# Patient Record
Sex: MALE | Race: WHITE | NOT HISPANIC OR LATINO | Employment: UNEMPLOYED | URBAN - METROPOLITAN AREA
[De-identification: names, ages, dates, MRNs, and addresses within clinical notes are randomized per-mention and may not be internally consistent; named-entity substitution may affect disease eponyms.]

---

## 2020-08-10 ENCOUNTER — OFFICE VISIT (OUTPATIENT)
Dept: FAMILY MEDICINE CLINIC | Facility: CLINIC | Age: 35
End: 2020-08-10
Payer: COMMERCIAL

## 2020-08-10 VITALS
OXYGEN SATURATION: 97 % | HEIGHT: 71 IN | TEMPERATURE: 98.2 F | HEART RATE: 71 BPM | BODY MASS INDEX: 28.7 KG/M2 | DIASTOLIC BLOOD PRESSURE: 88 MMHG | RESPIRATION RATE: 16 BRPM | WEIGHT: 205 LBS | SYSTOLIC BLOOD PRESSURE: 146 MMHG

## 2020-08-10 DIAGNOSIS — Z00.00 ANNUAL PHYSICAL EXAM: Primary | ICD-10-CM

## 2020-08-10 DIAGNOSIS — Z02.4 DRIVER'S PERMIT PE (PHYSICAL EXAMINATION): ICD-10-CM

## 2020-08-10 PROCEDURE — 90471 IMMUNIZATION ADMIN: CPT | Performed by: FAMILY MEDICINE

## 2020-08-10 PROCEDURE — 3008F BODY MASS INDEX DOCD: CPT | Performed by: FAMILY MEDICINE

## 2020-08-10 PROCEDURE — 4004F PT TOBACCO SCREEN RCVD TLK: CPT | Performed by: FAMILY MEDICINE

## 2020-08-10 PROCEDURE — 90732 PPSV23 VACC 2 YRS+ SUBQ/IM: CPT | Performed by: FAMILY MEDICINE

## 2020-08-10 PROCEDURE — 3725F SCREEN DEPRESSION PERFORMED: CPT | Performed by: FAMILY MEDICINE

## 2020-08-10 PROCEDURE — 81002 URINALYSIS NONAUTO W/O SCOPE: CPT | Performed by: FAMILY MEDICINE

## 2020-08-10 PROCEDURE — 99385 PREV VISIT NEW AGE 18-39: CPT | Performed by: FAMILY MEDICINE

## 2020-08-10 RX ORDER — METHADONE HYDROCHLORIDE 10 MG/ML
90 CONCENTRATE ORAL DAILY
COMMUNITY
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
## 2020-08-12 LAB
SL AMB  POCT GLUCOSE, UA: NEGATIVE
SL AMB POCT URINE PROTEIN: NORMAL

## 2021-11-27 ENCOUNTER — HOSPITAL ENCOUNTER (EMERGENCY)
Facility: HOSPITAL | Age: 36
Discharge: HOME | End: 2021-11-27
Attending: EMERGENCY MEDICINE
Payer: COMMERCIAL

## 2021-11-27 ENCOUNTER — APPOINTMENT (EMERGENCY)
Dept: RADIOLOGY | Facility: HOSPITAL | Age: 36
End: 2021-11-27
Payer: COMMERCIAL

## 2021-11-27 VITALS
BODY MASS INDEX: 27.11 KG/M2 | RESPIRATION RATE: 18 BRPM | WEIGHT: 200.18 LBS | DIASTOLIC BLOOD PRESSURE: 72 MMHG | OXYGEN SATURATION: 98 % | HEIGHT: 72 IN | HEART RATE: 63 BPM | SYSTOLIC BLOOD PRESSURE: 135 MMHG | TEMPERATURE: 98.3 F

## 2021-11-27 DIAGNOSIS — S50.12XA CONTUSION OF LEFT FOREARM, INITIAL ENCOUNTER: ICD-10-CM

## 2021-11-27 DIAGNOSIS — S60.212A CONTUSION OF LEFT WRIST, INITIAL ENCOUNTER: Primary | ICD-10-CM

## 2021-11-27 PROCEDURE — 99284 EMERGENCY DEPT VISIT MOD MDM: CPT | Performed by: EMERGENCY MEDICINE

## 2021-11-27 PROCEDURE — 73110 X-RAY EXAM OF WRIST: CPT

## 2021-11-27 PROCEDURE — 99283 EMERGENCY DEPT VISIT LOW MDM: CPT

## 2021-11-27 PROCEDURE — 73090 X-RAY EXAM OF FOREARM: CPT

## 2021-11-27 RX ORDER — IBUPROFEN 600 MG/1
600 TABLET ORAL ONCE
Status: COMPLETED | OUTPATIENT
Start: 2021-11-27 | End: 2021-11-27

## 2021-11-27 RX ORDER — ACETAMINOPHEN 325 MG/1
650 TABLET ORAL ONCE
Status: COMPLETED | OUTPATIENT
Start: 2021-11-27 | End: 2021-11-27

## 2021-11-27 RX ADMIN — IBUPROFEN 600 MG: 600 TABLET ORAL at 21:26

## 2021-11-27 RX ADMIN — ACETAMINOPHEN 650 MG: 325 TABLET, FILM COATED ORAL at 21:25

## 2022-04-07 ENCOUNTER — TELEPHONE (OUTPATIENT)
Dept: FAMILY MEDICINE CLINIC | Facility: CLINIC | Age: 37
End: 2022-04-07

## 2022-06-19 NOTE — TELEPHONE ENCOUNTER
06/18/22 10:45 PM     Please provide reason for pcp removal - patient has moved and no longer following with pcp/ office OR patient is now following with an external (non [facility]) PCP? PCP should not be removed at office level for this scenario; pcp added back to chart       Thank you  Sang Gunter

## 2022-06-21 NOTE — TELEPHONE ENCOUNTER
06/21/22 9:57 AM     Thank you for your request  Your request has been received, reviewed, and the patient chart updated  The PCP has successfully been removed with a patient attribution note  Please remember to notate 'patient attribution' as reason for call if request meets guideline scenarios  This message will now be completed      Thank you  Luis Fernando Haines

## 2023-03-29 ENCOUNTER — HOSPITAL ENCOUNTER (EMERGENCY)
Facility: HOSPITAL | Age: 38
Discharge: HOME | End: 2023-03-29
Attending: EMERGENCY MEDICINE

## 2023-03-29 VITALS
TEMPERATURE: 98.4 F | OXYGEN SATURATION: 97 % | SYSTOLIC BLOOD PRESSURE: 123 MMHG | RESPIRATION RATE: 18 BRPM | DIASTOLIC BLOOD PRESSURE: 76 MMHG | HEART RATE: 77 BPM

## 2023-03-29 DIAGNOSIS — K08.89 DENTALGIA: Primary | ICD-10-CM

## 2023-03-29 DIAGNOSIS — K02.9 DENTAL DECAY: ICD-10-CM

## 2023-03-29 RX ORDER — IBUPROFEN 800 MG/1
800 TABLET ORAL EVERY 6 HOURS PRN
Qty: 30 TABLET | Refills: 0 | Status: SHIPPED | OUTPATIENT
Start: 2023-03-29

## 2023-03-29 RX ORDER — PENICILLIN V POTASSIUM 250 MG/1
500 TABLET ORAL ONCE
Status: COMPLETED | OUTPATIENT
Start: 2023-03-29 | End: 2023-03-29

## 2023-03-29 RX ORDER — PENICILLIN V POTASSIUM 500 MG/1
500 TABLET ORAL 4 TIMES DAILY
Qty: 40 TABLET | Refills: 0 | Status: SHIPPED | OUTPATIENT
Start: 2023-03-29 | End: 2023-04-08

## 2023-03-29 RX ADMIN — PENICILLIN V POTASSIUM 500 MG: 250 TABLET, FILM COATED ORAL at 21:12
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
## 2023-03-30 NOTE — ED NOTES
D/c reviewed with pt  Pt verbalized understanding and has no further questions at this time  Pt ambulatory off unit with steady gait       Rachele Loyd RN  03/29/23 8917

## 2023-04-07 PROBLEM — Z00.8 EXAM FOR POPULATION SURVEY: Status: ACTIVE | Noted: 2023-04-07

## 2023-04-07 PROBLEM — Z11.59 NEED FOR HEPATITIS C SCREENING TEST: Status: ACTIVE | Noted: 2023-04-07

## 2023-04-07 PROBLEM — Z13.220 SCREENING, LIPID: Status: ACTIVE | Noted: 2020-08-10

## 2023-04-07 PROBLEM — Z11.4 SCREENING FOR HIV (HUMAN IMMUNODEFICIENCY VIRUS): Status: ACTIVE | Noted: 2020-08-10

## 2023-04-07 PROBLEM — Z13.89 SCREENING FOR BLOOD OR PROTEIN IN URINE: Status: ACTIVE | Noted: 2020-08-10

## 2023-04-11 PROBLEM — R11.0 NAUSEA: Status: ACTIVE | Noted: 2023-04-11

## 2023-04-11 PROBLEM — F32.A DEPRESSION: Status: ACTIVE | Noted: 2023-04-11

## 2023-05-08 DIAGNOSIS — F32.89 OTHER DEPRESSION: ICD-10-CM

## 2023-05-08 RX ORDER — SERTRALINE HYDROCHLORIDE 25 MG/1
TABLET, FILM COATED ORAL
Qty: 30 TABLET | Refills: 0 | Status: SHIPPED | OUTPATIENT
Start: 2023-05-08

## 2023-05-11 ENCOUNTER — TELEPHONE (OUTPATIENT)
Dept: FAMILY MEDICINE CLINIC | Facility: CLINIC | Age: 38
End: 2023-05-11

## 2023-05-11 NOTE — TELEPHONE ENCOUNTER
Hi, my name is Jack Moreno can reach me at [phone]  I'm calling about my Sertraline 25 Milligram tablets  I need a refill  I have a appointment scheduled for May 22nd    I would need something until then and then they said that they would prescribe it to me through them  So I was told to call back since I am now out of my medication and that would be [facility] [address]  in [place]  So give me a call back  Thank you

## 2023-05-22 ENCOUNTER — OFFICE VISIT (OUTPATIENT)
Dept: PSYCHIATRY | Facility: CLINIC | Age: 38
End: 2023-05-22

## 2023-05-22 DIAGNOSIS — F32.89 OTHER DEPRESSION: ICD-10-CM

## 2023-05-22 DIAGNOSIS — F43.10 PANIC ATTACK DUE TO POST TRAUMATIC STRESS DISORDER (PTSD): ICD-10-CM

## 2023-05-22 DIAGNOSIS — F41.0 PANIC ATTACK DUE TO POST TRAUMATIC STRESS DISORDER (PTSD): ICD-10-CM

## 2023-05-22 DIAGNOSIS — F43.10 PTSD (POST-TRAUMATIC STRESS DISORDER): Primary | ICD-10-CM

## 2023-05-22 RX ORDER — SERTRALINE HYDROCHLORIDE 25 MG/1
25 TABLET, FILM COATED ORAL DAILY
Qty: 30 TABLET | Refills: 1 | Status: SHIPPED | OUTPATIENT
Start: 2023-05-22
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
## 2023-05-23 ENCOUNTER — TELEPHONE (OUTPATIENT)
Dept: PSYCHIATRY | Facility: CLINIC | Age: 38
End: 2023-05-23

## 2023-05-23 NOTE — TELEPHONE ENCOUNTER
Writer contacted patient regarding today's appointment to switch to a virtual  Patient requested a reschedule as it is a np appointment   Patient has been rescheduled for 5/30at 3 pm

## 2023-05-30 ENCOUNTER — TELEPHONE (OUTPATIENT)
Dept: PSYCHIATRY | Facility: CLINIC | Age: 38
End: 2023-05-30

## 2023-05-31 ENCOUNTER — TELEPHONE (OUTPATIENT)
Dept: PSYCHIATRY | Facility: CLINIC | Age: 38
End: 2023-05-31

## 2023-06-06 PROBLEM — Z00.8 EXAM FOR POPULATION SURVEY: Status: RESOLVED | Noted: 2023-04-07 | Resolved: 2023-06-06

## 2023-06-06 PROBLEM — Z11.59 NEED FOR HEPATITIS C SCREENING TEST: Status: RESOLVED | Noted: 2023-04-07 | Resolved: 2023-06-06

## 2023-06-15 ENCOUNTER — TELEPHONE (OUTPATIENT)
Dept: PSYCHIATRY | Facility: CLINIC | Age: 38
End: 2023-06-15

## 2023-06-15 NOTE — TELEPHONE ENCOUNTER
Writer received a message in office that when patient was called to confirm their med mgmt. Appointment they inquired about rescheduling their np therapy appointment. Please reach out to patient at earliest convenience.

## 2023-06-19 ENCOUNTER — OFFICE VISIT (OUTPATIENT)
Dept: PSYCHIATRY | Facility: CLINIC | Age: 38
End: 2023-06-19
Payer: COMMERCIAL

## 2023-06-19 DIAGNOSIS — F32.89 OTHER DEPRESSION: ICD-10-CM

## 2023-06-19 PROCEDURE — 99213 OFFICE O/P EST LOW 20 MIN: CPT | Performed by: PSYCHIATRY & NEUROLOGY

## 2023-06-19 NOTE — PSYCH
Giancarlo Holguins 1985 [id number]     The patient was seen for continuing care and pharmacotherapy  Good and bad days  Gave his boss 2 weeks notice because of persistent anxiety and panic symptoms  He is also worried about his finances and future  First half of the day he is better but as the day progresses, he starts getting more anxious and irritable  Work remains the main source of her stress  He also has some somatic complaints in the form of aches and pains  Relationship with girlfriend is up and down  Sleep pattern remains interrupted  ROS:  Anxiety-muscle aches and pains  Current Mental Status Evaluation:  Appearance:  Adequate hygiene and grooming and Good eye contact   Behavior:  cooperative and restless and fidgety   Mood:  Anxious and Dysphoric   Affect: appropriate   Speech: Normal volume and Normal rate   Thought Process:  Goal directed and coherent   Thought Content:  Does not verbalize delusional material   Perceptual Disturbances: Denies hallucinations and does not appear to be responding to internal stimuli   Risk Potential: No suicidal or homicidal ideation   Orientation:        No diagnosis found  Current Outpatient Medications   Medication Instructions   • cholecalciferol (VITAMIN D3) 400 Units, Oral, Daily   • ibuprofen (MOTRIN) 800 mg, Oral, Every 6 hours PRN   • methadone (DOLOPHINE) 90 mg, Oral, Daily   • multivitamin (THERAGRAN) TABS 1 tablet, Oral, Daily   • ondansetron (ZOFRAN) 4 mg, Oral, Every 8 hours PRN   • sertraline (ZOLOFT) 25 mg, Oral, Daily          Treatment Recommendations- Risks Benefits    After several weeks of taking Zoloft 25 mg daily, he has not shown any signs of improvement  He was instructed to increase Zoloft to 50 mg daily  He has a number of 25 mg tablets and he was instructed to finish them by taking double doses and give me a call in 1 week to review his progress and assess the need to increase Zoloft further    Risks, Benefits And Possible Side Effects Of Medications:  Risks, benefits, and possible side effects of medications explained to patient and patient verbalizes understanding    VIRTUAL VISIT [facility] verbally agrees to participate in [employer]  Pt is aware that [employer] could be limited without vital signs or the ability to perform a full hands-on physical exam  Greer Faye understands he or the provider may request at any time to terminate the video visit and request the patient to seek care or treatment in person       Godwin Amaro MD 06/19/23

## 2023-06-20 ENCOUNTER — TELEPHONE (OUTPATIENT)
Dept: PSYCHIATRY | Facility: CLINIC | Age: 38
End: 2023-06-20

## 2023-06-20 NOTE — TELEPHONE ENCOUNTER
Patient is calling regarding cancelling an appointment      Date/Time: 6/20/2023 9am    Reason:     Patient was rescheduled: YES [] NO [x]  If yes, when was Patient reschedule for:     Patient requesting call back to reschedule: YES [x] NO []

## 2023-06-21 ENCOUNTER — TELEPHONE (OUTPATIENT)
Dept: PSYCHIATRY | Facility: CLINIC | Age: 38
End: 2023-06-21

## 2023-06-21 NOTE — TELEPHONE ENCOUNTER
Writer contacted patient to schedule 4 week follow up with provider    Patient is now scheduled for 7/17 @ 10:00am

## 2023-06-27 ENCOUNTER — OFFICE VISIT (OUTPATIENT)
Dept: GASTROENTEROLOGY | Facility: AMBULARY SURGERY CENTER | Age: 38
End: 2023-06-27
Payer: COMMERCIAL

## 2023-06-27 VITALS
BODY MASS INDEX: 22 KG/M2 | DIASTOLIC BLOOD PRESSURE: 78 MMHG | HEIGHT: 72 IN | RESPIRATION RATE: 18 BRPM | WEIGHT: 162.4 LBS | OXYGEN SATURATION: 100 % | SYSTOLIC BLOOD PRESSURE: 114 MMHG | HEART RATE: 75 BPM

## 2023-06-27 DIAGNOSIS — R11.0 NAUSEA: ICD-10-CM

## 2023-06-27 DIAGNOSIS — K59.03 DRUG-INDUCED CONSTIPATION: ICD-10-CM

## 2023-06-27 DIAGNOSIS — R63.4 UNINTENTIONAL WEIGHT LOSS: ICD-10-CM

## 2023-06-27 DIAGNOSIS — R10.84 GENERALIZED ABDOMINAL PAIN: Primary | ICD-10-CM

## 2023-06-27 PROCEDURE — 99244 OFF/OP CNSLTJ NEW/EST MOD 40: CPT | Performed by: INTERNAL MEDICINE

## 2023-06-27 RX ORDER — POLYETHYLENE GLYCOL 3350, SODIUM SULFATE ANHYDROUS, SODIUM BICARBONATE, SODIUM CHLORIDE, POTASSIUM CHLORIDE 236; 22.74; 6.74; 5.86; 2.97 G/4L; G/4L; G/4L; G/4L; G/4L
4000 POWDER, FOR SOLUTION ORAL ONCE
Qty: 4000 ML | Refills: 0 | Status: SHIPPED | OUTPATIENT
Start: 2023-06-27 | End: 2023-06-27
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
## 2023-06-27 NOTE — PATIENT INSTRUCTIONS
Constipation   WHAT YOU NEED TO KNOW:   Constipation means you have hard, dry bowel movements, or you go longer than usual between bowel movements  Medicines:   Medicine  such as a laxative may help relax and loosen your intestines to help you have a bowel movement  These medications are safe and help your bowels to move regularly to improve your symptoms  Take your medicine as directed  Contact your healthcare provider if you think your medicine is not helping or if you have side effects  Tell him of her if you are allergic to any medicine  Keep a list of the medicines, vitamins, and herbs you take  Include the amounts, and when and why you take them  Bring the list or the pill bottles to follow-up visits  Carry your medicine list with you in case of an emergency  Prevent constipation:   Drink liquids as directed  You may need to drink extra liquids to help soften and move your bowels  Ask how much liquid to drink each day and which liquids are best for you  Eat high-fiber foods  This may help decrease constipation by adding bulk to your bowel movements  High-fiber foods include fruit, vegetables, whole-grain breads and cereals, and beans  Your healthcare provider or dietitian can help you create a high-fiber meal plan  Your provider may also recommend a fiber supplement if you cannot get enough fiber from food  Exercise regularly  Regular physical activity can help stimulate your intestines  Walking is a good exercise to prevent or relieve constipation  Ask which exercises are best for you  Talk to your healthcare provider about your medicines  Certain medicines, such as opioids, can cause constipation  Your provider may be able to make medicine changes  For example, he or she may change the kind of medicine, or change when you take it  Follow up with your doctor as directed:  Write down your questions so you remember to ask them during your visits     © Copyright [employer] 2021 Information is for [employer] use only and may not be sold, redistributed or otherwise used for commercial purposes  All illustrations and images included in CareNotes® are the copyrighted property of A D A M , Inc  or Yaya Gifford  The above information is an  only  It is not intended as medical advice for individual conditions or treatments  Talk to your doctor, nurse or pharmacist before following any medical regimen to see if it is safe and effective for you  What is constipation? Constipation happens when fecal material (stool) moves through the large bowel (colon) too slowly  The fluid portion of the stool is absorbed back into the body, so the stool becomes hard and dry  This makes it difficult to pass the stool  What causes constipation? Poor nutrition, inadequate sleep, limited exercise, anxiety, emotional stress and age may cause constipation  Certain disease also can cause constipation, and are usually associated with a sudden change in bowel habits, pain, weight loss, fatigue or bloody stools  Contact your doctor if you experience these symptoms  Some medications cause constipation - talk to your doctor if you think your medications are causing constipation  Can eating more fiber help with constipation? Yes  Fiber is the part of plant food that is not digested  There are two kinds of fiber: soluble and insoluble  Soluble fiber gives stool bulk  Foods that are good sources of soluble fiber include apples, bananas, barley, oats, and beans  Insoluble fiber helps speed up the transit of food in the digestive tract and helps prevent constipation  Good sources of insoluble fiber include whole grains, most vegetables, wheat bran, and legumes  Foods that have fiber contain both soluble and insoluble fibers  A good goal for dietary fiber is a total of about 20 to 30 grams each day  GUIDELINES TO TREAT CONSTIPATION    Nutrition  Eat three meals each day   Do not "skip meals  Gradually increase the amount of high-fiber foods in your diet  Choose more whole grain breads, cereals and rice  Select more raw fruits and vegetables -- eat the peel, if appropriate  Read food labels and look for the \"dietary fiber\" content of foods  Good sources have 2 grams of fiber or more  Drink six to eight glasses of water each day  Limit highly refined and processed foods  Exercise and Sleep  Exercise regularly  Try to do weight-bearing exercise, such as walking, three or more times each week  Go to sleep at a regular time each night  Make sure you get enough sleep  Stress and Anxiety  Try to limit stress in your life  Go for a short walk when you feel anxiety or stress increasing  [facility] have reviewed this information  It is for educational purposes only and is not intended to replace the advice of your doctor or other health care provider  We encourage you to discuss any questions or concerns you may have with your provider  MEDICATIONS I RECOMMEND OVER THE COUNTER:    For your bowel habits, as we discussed during today's visit,  - I would recommend starting psyllium, fiber supplementation  This can be done with use of Metamucil or Benefiber fiber, which can be purchased over-the-counter  I would recommend starting slow with 1 tsp mixed into a large glass of water, once a day, and increasing to goal of 1 tbsp mixed into a large glass of water per day  - this helps with both diarrhea and constipation, helping to bulk the stool, and should not cause constipation or diarrhea, but treat both  - the only side effect of this can be bloating, if this occurs, cut down on the dose, and increase your water intake    DO THIS FOR 2 WEEKS, IF NO BENEFIT,    You have been recommended miralax (polyethylene glycol) for treatment of your CONSTIPATION  Start 1 capful daily  Take this dose x 3 days  If your symptoms are improved, great!  Continue this dose " daily     If you have diarrhea (stools are loose, associated with accidents, or urgency) with this dose, cut down to 1/2 capful daily  Continue to titrate down until you find the dose for you  This might be 1 tbsp daily  Wait 3 days before making a change  If you have continued constipation with 1 capful daily, you may need a higher dose  Start with 1 5 capfuls daily and titrate upward  Eg might need 1 capful twice daily  There is no maximum dose, whatever works for you   Again, wait 3 days before making a change     - gas-x (simethicone) or BEANO over the counter for symptoms of bloating      Scheduled date of EGD/colonoscopy (as of today):08/24/23  Physician performing EGD/colonoscopy:  dr Josafat Cabrera  [facility] of E[facility]colonoscopy[facility]  Desired bowel prep reviewed with patient: huma  Instructions reviewed with patient by:mare  Clearances:   n a

## 2023-07-11 ENCOUNTER — OFFICE VISIT (OUTPATIENT)
Dept: FAMILY MEDICINE CLINIC | Facility: CLINIC | Age: 38
End: 2023-07-11
Payer: COMMERCIAL

## 2023-07-11 VITALS
OXYGEN SATURATION: 97 % | SYSTOLIC BLOOD PRESSURE: 132 MMHG | HEART RATE: 84 BPM | HEIGHT: 72 IN | BODY MASS INDEX: 22.16 KG/M2 | WEIGHT: 163.6 LBS | RESPIRATION RATE: 17 BRPM | DIASTOLIC BLOOD PRESSURE: 70 MMHG | TEMPERATURE: 100.2 F

## 2023-07-11 DIAGNOSIS — K04.7 INFECTED TOOTH: Primary | ICD-10-CM

## 2023-07-11 DIAGNOSIS — F32.89 OTHER DEPRESSION: ICD-10-CM

## 2023-07-11 DIAGNOSIS — K08.9 POOR DENTITION: ICD-10-CM

## 2023-07-11 DIAGNOSIS — R68.84 PAIN IN LOWER JAW: ICD-10-CM

## 2023-07-11 PROCEDURE — 99214 OFFICE O/P EST MOD 30 MIN: CPT | Performed by: NURSE PRACTITIONER

## 2023-07-11 RX ORDER — AMOXICILLIN AND CLAVULANATE POTASSIUM 875; 125 MG/1; MG/1
1 TABLET, FILM COATED ORAL EVERY 12 HOURS SCHEDULED
Qty: 20 TABLET | Refills: 0 | Status: SHIPPED | OUTPATIENT
Start: 2023-07-11 | End: 2023-07-21
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
## 2023-07-11 NOTE — PROGRESS NOTES
PHQ-2/9 Depression Screening    Little interest or pleasure in doing things: 1 - several days  Feeling down, depressed, or hopeless: 1 - several days  Trouble falling or staying asleep, or sleeping too much: 3 - nearly every day  Feeling tired or having little energy: 3 - nearly every day  Poor appetite or overeatin - several days  Feeling bad about yourself - or that you are a failure or have let yourself or your family down: 2 - more than half the days  Trouble concentrating on things, such as reading the newspaper or watching television: 0 - not at all  Moving or speaking so slowly that other people could have noticed.  Or the opposite - being so fidgety or restless that you have been moving around a lot more than usual: 0 - not at all  Thoughts that you would be better off dead, or of hurting yourself in some way: 0 - not at all  PHQ-9 Score: 11   PHQ-9 Interpretation: Moderate depression

## 2023-07-11 NOTE — ASSESSMENT & PLAN NOTE
PHQ-9 scale reviewed. Patient currently sees Dr. Prince Conroy routinely. Depression follow-up information reviewed.

## 2023-07-11 NOTE — ASSESSMENT & PLAN NOTE
Patient to take Tylenol and ibuprofen to manage his pain. Currently on antibiotic therapy of Augmentin 875-125 mg p.o. twice daily for 10 days. Patient may use ice therapy as well  Patient scheduled to have x-rays of the lower jaw for further evaluation for possible prior jaw fracture/osteomyelitis.

## 2023-07-11 NOTE — ASSESSMENT & PLAN NOTE
Patient placed on Augmentin 875-125mg p.o. bid for 10 days. He currently takes tylenol and ibuprofen to manage the jaw pain on his left lower region of his mouth.    Instructed to schedule a follow-up with dental.

## 2023-07-11 NOTE — PATIENT INSTRUCTIONS
Depression   WHAT YOU NEED TO KNOW:   What is depression? Depression is a medical condition that causes feelings of sadness or hopelessness that do not go away. Depression may cause you to lose interest in things you used to enjoy. These feelings may interfere with your daily life. What causes or increases my risk for depression? Depression may be caused by changes in brain chemicals that affect your mood. Your risk for depression may be higher if you have any of the following:  Stressful events such as the death of a loved one, unemployment, or divorce    A family history of depression    A chronic medical condition, such as diabetes, heart disease, or cancer    Drug or alcohol abuse    What are the signs and symptoms of depression? Appetite changes, or weight gain or loss    Trouble going to sleep or staying asleep, or sleeping too much    Fatigue or lack of energy    Feeling restless, irritable, or withdrawn    Feeling worthless, hopeless, discouraged, or guilty    Trouble concentrating, remembering things, doing daily tasks, or making decisions    Thoughts about hurting or killing yourself    How is depression diagnosed? Your healthcare provider will ask about your symptoms and how long you have had them. He or she will ask if you have any family members with depression. Tell your healthcare provider about any stressful events in your life. He or she may ask about any other health conditions or medicines you take. How is depression treated? Therapy  is often used together with medicine. Therapy is a way for you to talk about your feelings and anything that may be causing depression. Therapy can be done alone or in a group. It may also be done with family members or a significant other. Antidepressant medicine  may be given to relieve depression. You may need to take this medicine for several weeks before you begin to feel better.  Tell your healthcare provider about any side effects or problems you have with your medicine. The type or amount of medicine may need to be changed. How can I manage depression? Get regular physical activity. Try to be active for 30 minutes, 3 to 5 days a week. Physical activity can help relieve depression. Work with your healthcare provider to develop a plan that you enjoy. It may help to ask someone to be active with you. Create a regular sleep schedule. A routine can help you relax before bed. Listen to music, read, or do yoga. Try to go to bed and wake up at the same time every day. Sleep is important for emotional health. Eat a variety of healthy foods. Healthy foods include fruits, vegetables, whole-grain breads, low-fat dairy products, lean meats, fish, and cooked beans. A healthy meal plan is low in fat, salt, and added sugar. Do not drink alcohol or use drugs. Alcohol and drugs can make depression worse. Talk to your therapist or doctor if you need help quitting. The following resources are available at any time to help you, if needed:   Contact a suicide prevention organization: For the [employer] Suicide and Crisis Lifeline:     Call or text 69431 96 91 06 a chat on https://[employer]/chat     Call 8-696.237.8674 (7-829-425-TALK)    For the Suicide Hotline, call 2-958.358.5805 (0-052-ADVPDCQ)    For a list of international numbers: https://save.org/find-help/international-resources/  Where can I find more information or support? [employer] on [facility] Illness  [address] [facility] , [address]  Phone: 9- 538 - 957-2671  Phone: 1- 825 - 298-6987  Web Address: http://www.[employer]/. org  [address] [facility] Suicide and [address] [place] [phone]  Phone: 4- 178 - 559  Web Address: [employer] OR https://[employer]/chat/    Call your local emergency number (911 in the [address]) if:   You think about harming yourself or someone else.     You have done something on purpose to hurt yourself. When should I call my therapist or doctor? Your symptoms do not improve. You cannot make it to your next appointment. You have new symptoms. You have questions or concerns about your condition or care. CARE AGREEMENT:   You have the right to help plan your care. Learn about your health condition and how it may be treated. Discuss treatment options with your healthcare providers to decide what care you want to receive. You always have the right to refuse treatment. The above information is an  only. It is not intended as medical advice for individual conditions or treatments. Talk to your doctor, nurse or pharmacist before following any medical regimen to see if it is safe and effective for you. © Copyright Reanna Moandrés 2022 Information is for End User's use only and may not be sold, redistributed or otherwise used for commercial purposes. Tooth Extraction   AMBULATORY CARE:   What you need to know about a tooth extraction:  A tooth extraction is a procedure to remove 1 or more teeth. Your healthcare provider will talk to you about how to prepare for this procedure. He or she will tell you what medicines to take or not take on the day of your procedure. What will happen during a tooth extraction:  Your dentist or oral surgeon may use medicine to numb the gum around the tooth and dull the pain. You may still feel pressure or pushing during the procedure. He or she may also give you medicine to keep you asleep and free from pain during the procedure. Your surgeon will first use a tool to loosen your tooth. When the tooth is loose enough, the surgeon will use forceps to pull the tooth out. He or she may rinse the site with a sterile solution. The surgeon will apply gauze on the extraction site and ask you to bite down to help control bleeding. What will happen after a tooth extraction:  You may have light bleeding for up to 12 hours after your procedure.  You may also have pain, swelling, and trouble opening your mouth completely. Risks of a tooth extraction:   You may bleed more than expected or get an infection. You may have trouble fully opening your mouth for a longer period of time than expected. You may develop dry socket. Dry socket is a condition that prevents a blood clot from forming on the extraction site as it should, or it gets dislodged. Dry socket can cause severe pain. A part of the bone that holds your tooth in place may be broken during the tooth extraction. This can cause your upper and lower teeth to become misaligned. It can also lead to an infection or tingling or numbness. The top part of your tooth may break while it is being pulled. Your surgeon may need to cut your gum or part of your bone to remove the rest of the tooth. The nerve near the root of your tooth may be injured during this surgery. Seek care immediately if:   You have bleeding that has not decreased within 12 hours after your tooth extraction. Contact your dentist or oral surgeon if:   You have a fever. You have severe, throbbing pain and swelling that do not improve with treatment. You have a foul taste or drainage coming from the extraction site. You feel like your teeth have moved or that your teeth are not aligned. You have numbness or tingling in your mouth. You still cannot fully open your mouth 1 week after your tooth extraction. You have questions or concerns about your condition or care. Medicines: You may need any of the following:  Acetaminophen  decreases pain. It is available without a doctor's order. Ask how much to take and how often to take it. Follow directions. Acetaminophen can cause liver damage if not taken correctly. Prescription pain medicine  may be given. Ask your healthcare provider how to take this medicine safely. Some prescription pain medicines contain acetaminophen.  Do not take other medicines that contain acetaminophen without talking to your healthcare provider. Too much acetaminophen may cause liver damage. Prescription pain medicine may cause constipation. Ask your healthcare provider how to prevent or treat constipation. Take your medicine as directed. Contact your healthcare provider if you think your medicine is not helping or if you have side effects. Tell your provider if you are allergic to any medicine. Keep a list of the medicines, vitamins, and herbs you take. Include the amounts, and when and why you take them. Bring the list or the pill bottles to follow-up visits. Carry your medicine list with you in case of an emergency. Self-care:   Keep the gauze in place for 2 hours after your procedure. This helps to control bleeding by forming a blood clot. Do not rinse your mouth for 24 hours. This helps decrease your risk for dry socket. Do not smoke or drink from a straw for 3 days. You may develop a dry socket if you smoke or use a straw. Eat only soft foods and liquids for 24 hours. This helps control pain. Apply ice on any swollen areas of your face for 15 to 20 minutes every hour or as directed. Use an ice pack, or put crushed ice in a plastic bag. Cover it with a towel before you apply it to your skin. Ice helps prevent tissue damage and decreases swelling and pain. To help decrease swelling, sleep with your head elevated. Do not sleep on your side. Avoid exercise as directed. Exercise can increase your blood pressure and make your extraction site bleed. Follow up with your dentist or oral surgeon as directed:  Write down your questions so you remember to ask them during your visits. © Copyright [facility] 2022 Information is for End User's use only and may not be sold, redistributed or otherwise used for commercial purposes. The above information is an  only. It is not intended as medical advice for individual conditions or treatments.  Talk to your doctor, nurse or pharmacist before following any medical regimen to see if it is safe and effective for you. Toothache   WHAT YOU NEED TO KNOW:   What is a toothache and what causes it? A toothache is pain that is caused by irritation of the nerves in the center of your tooth. The irritation may be caused by several problems, such as a cavity, an infection, a cracked tooth, or gum disease. How is a toothache diagnosed? Your healthcare provider will ask how long you have had a toothache. He or she will also ask if you have any other symptoms or medical conditions. Your healthcare provider will examine your tooth and mouth. Your provider may also examine your face and neck. You may need x-rays to check for an infection or cracked tooth. How is a toothache treated? Treatment depends on the cause of your toothache. You may need any of the following:  NSAIDs , such as ibuprofen, help decrease swelling, pain, and fever. This medicine is available with or without a doctor's order. NSAIDs can cause stomach bleeding or kidney problems in certain people. If you take blood thinner medicine, always ask if NSAIDs are safe for you. Always read the medicine label and follow directions. Do not give these medicines to children younger than 6 months without direction from a healthcare provider. Acetaminophen  decreases pain and fever. It is available without a doctor's order. Ask how much to take and how often to take it. Follow directions. Acetaminophen can cause liver damage if not taken correctly. Prescription pain medicine  may be given. Ask your healthcare provider how to take this medicine safely. Some prescription pain medicines contain acetaminophen. Do not take other medicines that contain acetaminophen without talking to your healthcare provider. Too much acetaminophen may cause liver damage. Prescription pain medicine may cause constipation. Ask your healthcare provider how to prevent or treat constipation. Antibiotics  help treat or prevent a bacterial infection. How can I manage my toothache? Rinse your mouth with warm salt water  4 times a day or as directed. Eat soft foods  to help relieve pain caused by chewing. Apply ice on your jaw or cheek  for 15 to 20 minutes every hour or as directed. Use an ice pack, or put crushed ice in a plastic bag. Cover it with a towel before you apply it. Ice helps prevent tissue damage and decreases swelling and pain. How can I help prevent a toothache? Brush your teeth at least 2 times a day. Use dental floss to clean between your teeth at least 1 time a day. See your dentist regularly every 6 months for dental cleanings and oral exams. When should I seek immediate care? You have trouble breathing or swallowing. You have swelling in your face or neck. When should I contact my dentist?   You have a fever and chills. You have trouble opening or closing your mouth. You have swelling around your tooth. You have questions or concerns about your condition or care. CARE AGREEMENT:   You have the right to help plan your care. Learn about your health condition and how it may be treated. Discuss treatment options with your healthcare providers to decide what care you want to receive. You always have the right to refuse treatment. The above information is an  only. It is not intended as medical advice for individual conditions or treatments. Talk to your doctor, nurse or pharmacist before following any medical regimen to see if it is safe and effective for you. © Copyright [employer] 2022 Information is for End User's use only and may not be sold, redistributed or otherwise used for commercial purposes. Dental Cavities   WHAT YOU NEED TO KNOW:   What are dental cavities? Dental cavities, also called caries, are holes in teeth caused by bacteria. The bacteria mix with carbohydrates from foods and create acids.  The acids break down areas of enamel, which covers the outside of a tooth. What increases my risk for dental cavities? Poor tooth care    Sugary foods and drinks    Not seeing your dentist every 6 months    Tightly spaced teeth that are hard to clean and floss    Dry mouth, caused by certain treatments or medicines    Not enough fluoride in water or not using dental products with fluoride    What are symptoms of dental cavities? You may not have any symptoms if cavities have just started to form. When cavities reach deeper parts of your tooth, you may start to feel pain. You may also have any of the following:  Pain when you chew or eat hot or cold foods    Chalky white, yellow, or brown tooth    Gum swelling    How are dental cavities diagnosed? Your dentist will look at your teeth to check for signs of enamel breakdown and cavities. He or she may also use x-rays to find cavities. How are dental cavities treated? A fluoride treatment  may be given during dental visits, or you may use products with fluoride at home. Your dentist will tell you what kind of fluoride you need and how to use it. A filling  may be placed in your tooth after the decayed portion is removed. The filling may help to protect your tooth from more decay. A root canal  may be needed if the tooth is infected or the decay is severe. How can I help prevent dental cavities? Brush your teeth at least 2 times a day  with fluoride toothpaste. Use dental floss at least once a day  to clean between your teeth. See your dentist every 6 months  for dental cleanings and oral exams. Rinse your mouth with water or mouthwash  after meals and snacks. Chew sugarless gum. Eat a variety of healthy foods. Healthy foods include fruits, vegetables, whole-grain breads, low-fat dairy products, beans, lean meats, and fish. Avoid sugary and starchy food and drinks that can stick between your teeth. When should I seek immediate care?    You have severe pain in your tooth or jaw. You have swelling in your jaw or cheek. When should I call my dentist?   You have a fever. Your tooth pain gets worse. You have questions or concerns about your condition or care. CARE AGREEMENT:   You have the right to help plan your care. Learn about your health condition and how it may be treated. Discuss treatment options with your healthcare providers to decide what care you want to receive. You always have the right to refuse treatment. The above information is an  only. It is not intended as medical advice for individual conditions or treatments. Talk to your doctor, nurse or pharmacist before following any medical regimen to see if it is safe and effective for you. © Copyright [employer] 2022 Information is for End User's use only and may not be sold, redistributed or otherwise used for commercial purposes.

## 2023-07-17 ENCOUNTER — OFFICE VISIT (OUTPATIENT)
Dept: PSYCHIATRY | Facility: CLINIC | Age: 38
End: 2023-07-17
Payer: COMMERCIAL

## 2023-07-17 DIAGNOSIS — F32.89 OTHER DEPRESSION: ICD-10-CM

## 2023-07-17 PROCEDURE — 99213 OFFICE O/P EST LOW 20 MIN: CPT | Performed by: PSYCHIATRY & NEUROLOGY

## 2023-07-17 RX ORDER — SERTRALINE HYDROCHLORIDE 100 MG/1
100 TABLET, FILM COATED ORAL DAILY
Qty: 30 TABLET | Refills: 1 | Status: SHIPPED | OUTPATIENT
Start: 2023-07-17
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
## 2023-07-18 ENCOUNTER — APPOINTMENT (EMERGENCY)
Dept: RADIOLOGY | Facility: HOSPITAL | Age: 38
End: 2023-07-18
Payer: COMMERCIAL

## 2023-07-18 ENCOUNTER — TELEPHONE (OUTPATIENT)
Dept: PSYCHIATRY | Facility: CLINIC | Age: 38
End: 2023-07-18

## 2023-07-18 ENCOUNTER — HOSPITAL ENCOUNTER (EMERGENCY)
Facility: HOSPITAL | Age: 38
Discharge: HOME | End: 2023-07-18
Attending: EMERGENCY MEDICINE
Payer: COMMERCIAL

## 2023-07-18 VITALS
OXYGEN SATURATION: 96 % | TEMPERATURE: 98.2 F | HEART RATE: 94 BPM | SYSTOLIC BLOOD PRESSURE: 137 MMHG | DIASTOLIC BLOOD PRESSURE: 64 MMHG | RESPIRATION RATE: 16 BRPM

## 2023-07-18 DIAGNOSIS — S61.213A LACERATION OF LEFT MIDDLE FINGER WITHOUT FOREIGN BODY WITHOUT DAMAGE TO NAIL, INITIAL ENCOUNTER: Primary | ICD-10-CM

## 2023-07-18 PROCEDURE — 90715 TDAP VACCINE 7 YRS/> IM: CPT | Performed by: EMERGENCY MEDICINE

## 2023-07-18 PROCEDURE — 73140 X-RAY EXAM OF FINGER(S): CPT

## 2023-07-18 RX ORDER — ROPIVACAINE HYDROCHLORIDE 5 MG/ML
3 INJECTION, SOLUTION EPIDURAL; INFILTRATION; PERINEURAL ONCE
Status: COMPLETED | OUTPATIENT
Start: 2023-07-18 | End: 2023-07-18

## 2023-07-18 RX ADMIN — TETANUS TOXOID, REDUCED DIPHTHERIA TOXOID AND ACELLULAR PERTUSSIS VACCINE, ADSORBED 0.5 ML: 5; 2.5; 8; 8; 2.5 SUSPENSION INTRAMUSCULAR at 20:37

## 2023-07-18 RX ADMIN — ROPIVACAINE HYDROCHLORIDE 3 ML: 5 INJECTION, SOLUTION EPIDURAL; INFILTRATION; PERINEURAL at 20:36

## 2023-07-18 NOTE — TELEPHONE ENCOUNTER
Writer left a message for the patient to schedule their 6 week follow up around 8/28. Office number was left and patient was asked to call back.

## 2023-07-19 NOTE — ED NOTES
Discharge instructions reviewed with pt. Pt verbalized understanding. And has no further questions at this time. Pt ambulatory off unit with steady gait.       Jhony Huggins RN  07/18/23 0095

## 2023-07-19 NOTE — ED PROVIDER NOTES
History  Chief Complaint   Patient presents with   • Finger Laceration     Pt reports cutting left 3rd digit on plastic part of car bumper, no thinners, UTD on tetanus     Patient reports that he cut himself on the silver laminate of a car bumper just prior to arrival.  He is unsure when his last tetanus vaccine was. He has no difficulty moving his finger and denies numbness. He does have moderate pain. Prior to Admission Medications   Prescriptions Last Dose Informant Patient Reported? Taking?   amoxicillin-clavulanate (AUGMENTIN) 875-125 mg per tablet   No No   Sig: Take 1 tablet by mouth every 12 (twelve) hours for 10 days   cholecalciferol (VITAMIN D3) 400 units tablet   Yes No   Sig: Take 400 Units by mouth daily   ibuprofen (MOTRIN) 800 mg tablet   No No   Sig: Take 1 tablet (800 mg total) by mouth every 6 (six) hours as needed (Pain)   methadone (DOLOPHINE) 10 mg/mL oral concentrated solution  Self Yes No   Sig: Take 90 mg by mouth daily   multivitamin (THERAGRAN) TABS   Yes No   Sig: Take 1 tablet by mouth daily   ondansetron (ZOFRAN) 4 mg tablet   No No   Sig: Take 1 tablet (4 mg total) by mouth every 8 (eight) hours as needed for nausea or vomiting   polyethylene glycol (Golytely) 4000 mL solution   No No   Sig: Take 4,000 mL by mouth once for 1 dose Take 4000 mL by mouth once for 1 dose.  Use as directed   sertraline (ZOLOFT) 100 mg tablet   No No   Sig: Take 1 tablet (100 mg total) by mouth daily      Facility-Administered Medications: None       Past Medical History:   Diagnosis Date   • Anxiety    • Depression    • Drug abuse and dependence ([address])    • Nerve damage     bilateral arms    • PTSD (post-traumatic stress disorder)        Past Surgical History:   Procedure Laterality Date   • TONSILECTOMY AND ADNOIDECTOMY         Family History   Problem Relation Age of Onset   • Cancer Mother    • Alcohol abuse Father    • Substance Abuse Father    • No Known Problems Sister    • No Known Problems Brother    • No Known Problems Son    • No Known Problems Daughter    • No Known Problems Maternal Grandmother    • No Known Problems Maternal Grandfather    • Cancer Paternal Grandmother    • No Known Problems Paternal Grandfather    • No Known Problems Maternal Aunt    • No Known Problems Maternal Uncle    • No Known Problems Paternal Aunt    • Multiple sclerosis Paternal Uncle    • No Known Problems Cousin      I have reviewed and agree with the history as documented. E-Cigarette/Vaping   • E-Cigarette Use Current Every Day User    • Comments fills vial once daily      E-Cigarette/Vaping Substances   • Nicotine Yes    • THC No    • CBD Yes helps with anxiety   • Flavoring Yes    • Other No    • Unknown No      Social History     Tobacco Use   • Smoking status: Every Day     Packs/day: 0.25     Years: 10.00     Total pack years: 2.50     Types: Cigarettes   • Smokeless tobacco: Former   Vaping Use   • Vaping Use: Every day   • Substances: Nicotine, CBD (helps with anxiety), Flavoring   Substance Use Topics   • Alcohol use: Not Currently   • Drug use: Yes     Types: Marijuana       Review of Systems   All other systems reviewed and are negative. Physical Exam  Physical Exam  Vitals and nursing note reviewed. HENT:      Head: Normocephalic. Mouth/Throat:      Mouth: Mucous membranes are moist.   Eyes:      Conjunctiva/sclera: Conjunctivae normal.   Cardiovascular:      Pulses: Normal pulses. Pulmonary:      Effort: Pulmonary effort is normal.   Musculoskeletal:      Cervical back: Normal range of motion. Skin:     General: Skin is warm. Comments: 2 cm curvilinear laceration over pad of middle finger   Neurological:      General: No focal deficit present. Mental Status: He is alert.    Psychiatric:         Mood and Affect: Mood normal.         Vital Signs  ED Triage Vitals   Temperature Pulse Respirations Blood Pressure SpO2   07/18/23 2009 07/18/23 2009 07/18/23 2008 07/18/23 2009 07/18/23 2009   98.2 °F (36.8 °C) 94 16 137/64 96 %      Temp Source Heart Rate Source Patient Position - Orthostatic VS BP Location FiO2 (%)   07/18/23 2009 -- -- -- --   Oral          Pain Score       07/18/23 2036       No Pain           Vitals:    07/18/23 2009   BP: 137/64   Pulse: 94         Visual Acuity      ED Medications  Medications   tetanus-diphtheria-acellular pertussis (BOOSTRIX) IM injection 0.5 mL (0.5 mL Intramuscular Given 7/18/23 2037)   ropivacaine (NAROPIN) 0.5 % injection 3 mL (3 mL Infiltration Given 7/18/23 2036)       Diagnostic Studies  Results Reviewed     None                 XR finger third digit-middle LEFT   ED Interpretation by Tova Vasques MD (07/18 2037)   No fracture, no radio opaque foreign body                 Procedures  Laceration repair    Date/Time: 7/18/2023 8:54 PM    Performed by: Tova Vasques MD  Authorized by: Tova Vasques MD  Body area: upper extremity  Location details: right long finger  Laceration length: 2 cm  Foreign bodies: no foreign bodies  Tendon involvement: none  Nerve involvement: none  Vascular damage: no  Anesthesia: digital block    Anesthesia:  Local anesthetic: 0.5% ropivacaine. Anesthetic total: 3 mL    Sedation:  Patient sedated: no        Procedure Details:  Skin closure: glue and Steri-Strips  Approximation: close  Approximation difficulty: simple  Dressing: Bulky Venita dressing to minimize movement. ED Course  ED Course as of 07/18/23 2056 Tue Jul 18, 2023 2056 Patient tolerated procedure well and there were no complications. SBIRT 22yo+    Flowsheet Row Most Recent Value   Initial Alcohol Screen: US AUDIT-C     1. How often do you have a drink containing alcohol? 0 Filed at: 07/18/2023 2045   2. How many drinks containing alcohol do you have on a typical day you are drinking? 0 Filed at: 07/18/2023 2045   3a. Male UNDER 65:  How often do you have five or more drinks on one occasion? 0 Filed at: 07/18/2023 2045   3b. FEMALE Any Age, or MALE 65+: How often do you have 4 or more drinks on one occassion? 0 Filed at: 07/18/2023 2045   Audit-C Score 0 Filed at: 07/18/2023 2045   NEHEMIAH: How many times in the past year have you. .. Used an illegal drug or used a prescription medication for non-medical reasons? Never Filed at: 07/18/2023 2045                    Medical Decision Making  I examined the patient with no evidence of tendon laceration either when exploring wound or checking distal movement. I discussed the risk of wound dehiscence with sutures versus Steri-Strips. Patient strongly prefers Steri-Strips. Amount and/or Complexity of Data Reviewed  Radiology: ordered and independent interpretation performed. Risk  Prescription drug management. Disposition  Final diagnoses:   Laceration of left middle finger without foreign body without damage to nail, initial encounter     Time reflects when diagnosis was documented in both MDM as applicable and the Disposition within this note     Time User Action Codes Description Comment    7/18/2023  8:51 PM Car Montemayor Add [S61.213A] Laceration of left middle finger without foreign body without damage to nail, initial encounter       ED Disposition     ED Disposition   Discharge    Condition   Stable    Date/Time   Tue Jul 18, 2023  8:51 PM    Caity Boswell discharge to home/self care. Follow-up Information    None         Patient's Medications   Discharge Prescriptions    No medications on file       No discharge procedures on file.     PDMP Review     None          ED Provider  Electronically Signed by           Alana Tillman MD  07/18/23 2056

## 2023-07-20 ENCOUNTER — TELEPHONE (OUTPATIENT)
Dept: FAMILY MEDICINE CLINIC | Facility: CLINIC | Age: 38
End: 2023-07-20

## 2023-07-20 NOTE — TELEPHONE ENCOUNTER
----- Message from Jose Osei, [address] [facility] sent at 7/19/2023  4:08 PM EDT -----  Please call patient and notify test results are normal.  No acute osseous abnormality. No radiopaque foreign body is seen.

## 2023-08-01 NOTE — PROGRESS NOTES
BMI Counseling: Body mass index is 22.24 kg/m². The BMI is above normal. Nutrition recommendations include decreasing portion sizes, encouraging healthy choices of fruits and vegetables, decreasing fast food intake, consuming healthier snacks, limiting drinks that contain sugar, moderation in carbohydrate intake, increasing intake of lean protein, reducing intake of saturated and trans fat and reducing intake of cholesterol. Exercise recommendations include vigorous physical activity 75 minutes/week, exercising 3-5 times per week, obtaining a gym membership and strength training exercises. No pharmacotherapy was ordered. Rationale for BMI follow-up plan is due to patient being overweight or obese. Depression Screening and Follow-up Plan: Their PHQ-9 score was 7. Patient assessed for underlying major depression. Brief counseling provided and recommend additional follow-up/re-evaluation next office visit. Continue regular follow-up with their mental health provider who is managing their mental health condition(s). Assessment/Plan:         Problem List Items Addressed This Visit    None        Subjective:      Patient ID: Dianne Manuel is a 30 y.o. male. Patient is a 30year old male present for tooth infection. The following portions of the patient's history were reviewed and updated as appropriate:   Past Medical History:  He has a past medical history of Anxiety, Depression, Drug abuse and dependence ([address]), Nerve damage, and PTSD (post-traumatic stress disorder). ,  _______________________________________________________________________  Medical Problems:  does not have any pertinent problems on file.,  _______________________________________________________________________  Past Surgical History:   has a past surgical history that includes TONSILECTOMY AND ADNOIDECTOMY.,  _______________________________________________________________________  Family History:  family history includes Alcohol abuse in his father; Cancer in his mother and paternal grandmother; Multiple sclerosis in his paternal uncle; No Known Problems in his brother, cousin, daughter, maternal aunt, maternal grandfather, maternal grandmother, maternal uncle, paternal aunt, paternal grandfather, sister, and son; Substance Abuse in his father.,  _______________________________________________________________________  Social History:   reports that he has been smoking cigarettes. He has a 2.50 pack-year smoking history. He has quit using smokeless tobacco. He reports that he does not currently use alcohol. He reports current drug use. Drug: Marijuana. ,  _______________________________________________________________________  Allergies:  has No Known Allergies. .  _______________________________________________________________________  Current Outpatient Medications   Medication Sig Dispense Refill   • cholecalciferol (VITAMIN D3) 400 units tablet Take 400 Units by mouth daily     • ibuprofen (MOTRIN) 800 mg tablet Take 1 tablet (800 mg total) by mouth every 6 (six) hours as needed (Pain) 30 tablet 0   • methadone (DOLOPHINE) 10 mg/mL oral concentrated solution Take 90 mg by mouth daily     • multivitamin (THERAGRAN) TABS Take 1 tablet by mouth daily     • ondansetron (ZOFRAN) 4 mg tablet Take 1 tablet (4 mg total) by mouth every 8 (eight) hours as needed for nausea or vomiting 10 tablet 0   • polyethylene glycol (Golytely) 4000 mL solution Take 4,000 mL by mouth once for 1 dose Take 4000 mL by mouth once for 1 dose. Use as directed 4000 mL 0   • sertraline (ZOLOFT) 100 mg tablet Take 1 tablet (100 mg total) by mouth daily 30 tablet 1     No current facility-administered medications for this visit.     _______________________________________________________________________  Review of Systems   Constitutional: Negative for activity change, appetite change, chills, fatigue, fever and unexpected weight change.    HENT: Positive for dental problem. Negative for congestion, ear discharge, ear pain, nosebleeds, postnasal drip, rhinorrhea, sinus pressure, sinus pain, sneezing, sore throat and voice change. Recurrent tooth infection. Eyes: Negative for pain, redness and visual disturbance. Respiratory: Negative for cough, chest tightness, shortness of breath and wheezing. Cardiovascular: Negative for chest pain and palpitations. Gastrointestinal: Negative for abdominal distention, abdominal pain, constipation, diarrhea, nausea and vomiting. Endocrine: Negative. Genitourinary: Negative for difficulty urinating, dysuria, flank pain, frequency, hematuria and urgency. Musculoskeletal: Negative for arthralgias and myalgias. Skin: Negative. Allergic/Immunologic: Negative. Neurological: Negative. Hematological: Negative. Psychiatric/Behavioral: Negative. Objective: There were no vitals filed for this visit. There is no height or weight on file to calculate BMI. Physical Exam  Vitals and nursing note reviewed. Constitutional:       Appearance: Normal appearance. He is well-developed. HENT:      Head: Normocephalic and atraumatic. Right Ear: Tympanic membrane, ear canal and external ear normal.      Left Ear: Tympanic membrane, ear canal and external ear normal.      Nose: Nose normal. No congestion or rhinorrhea. Mouth/Throat:      Mouth: Mucous membranes are moist.      Pharynx: No oropharyngeal exudate or posterior oropharyngeal erythema. Eyes:      Extraocular Movements: Extraocular movements intact. Conjunctiva/sclera: Conjunctivae normal.      Pupils: Pupils are equal, round, and reactive to light. Cardiovascular:      Rate and Rhythm: Normal rate and regular rhythm. Pulses: Normal pulses. Heart sounds: Normal heart sounds. No murmur heard. Pulmonary:      Effort: Pulmonary effort is normal.      Breath sounds: Normal breath sounds.    Abdominal:      General: Bowel sounds are normal.      Palpations: Abdomen is soft. Musculoskeletal:         General: Normal range of motion. Cervical back: Normal range of motion. Skin:     General: Skin is warm. Neurological:      General: No focal deficit present. Mental Status: He is alert and oriented to person, place, and time.    Psychiatric:         Mood and Affect: Mood normal.         Behavior: Behavior normal.

## 2023-08-02 ENCOUNTER — TELEPHONE (OUTPATIENT)
Dept: FAMILY MEDICINE CLINIC | Facility: CLINIC | Age: 38
End: 2023-08-02

## 2023-08-02 ENCOUNTER — OFFICE VISIT (OUTPATIENT)
Dept: FAMILY MEDICINE CLINIC | Facility: CLINIC | Age: 38
End: 2023-08-02
Payer: COMMERCIAL

## 2023-08-02 VITALS
HEART RATE: 76 BPM | OXYGEN SATURATION: 96 % | HEIGHT: 72 IN | RESPIRATION RATE: 17 BRPM | SYSTOLIC BLOOD PRESSURE: 138 MMHG | BODY MASS INDEX: 22.21 KG/M2 | TEMPERATURE: 99.5 F | DIASTOLIC BLOOD PRESSURE: 64 MMHG | WEIGHT: 164 LBS

## 2023-08-02 DIAGNOSIS — K08.89 DENTALGIA: ICD-10-CM

## 2023-08-02 DIAGNOSIS — R68.84 PAIN IN LOWER JAW: ICD-10-CM

## 2023-08-02 DIAGNOSIS — F43.10 PTSD (POST-TRAUMATIC STRESS DISORDER): ICD-10-CM

## 2023-08-02 DIAGNOSIS — K02.9 DENTAL DECAY: ICD-10-CM

## 2023-08-02 DIAGNOSIS — F41.0 PANIC ATTACK DUE TO POST TRAUMATIC STRESS DISORDER (PTSD): ICD-10-CM

## 2023-08-02 DIAGNOSIS — K04.7 INFECTED TOOTH: Primary | ICD-10-CM

## 2023-08-02 DIAGNOSIS — F43.10 PANIC ATTACK DUE TO POST TRAUMATIC STRESS DISORDER (PTSD): ICD-10-CM

## 2023-08-02 DIAGNOSIS — K02.9 DENTAL DECAY: Primary | ICD-10-CM

## 2023-08-02 DIAGNOSIS — F32.89 OTHER DEPRESSION: ICD-10-CM

## 2023-08-02 PROCEDURE — 99215 OFFICE O/P EST HI 40 MIN: CPT | Performed by: NURSE PRACTITIONER

## 2023-08-02 RX ORDER — IBUPROFEN 800 MG/1
800 TABLET ORAL EVERY 6 HOURS PRN
Qty: 30 TABLET | Refills: 0 | Status: SHIPPED | OUTPATIENT
Start: 2023-08-02

## 2023-08-02 RX ORDER — AMOXICILLIN 875 MG/1
875 TABLET, COATED ORAL 2 TIMES DAILY
Qty: 20 TABLET | Refills: 0 | Status: SHIPPED | OUTPATIENT
Start: 2023-08-02 | End: 2023-08-12

## 2023-08-02 NOTE — PATIENT INSTRUCTIONS
Toothache   WHAT YOU NEED TO KNOW:   What is a toothache and what causes it? A toothache is pain that is caused by irritation of the nerves in the center of your tooth. The irritation may be caused by several problems, such as a cavity, an infection, a cracked tooth, or gum disease. How is a toothache diagnosed? Your healthcare provider will ask how long you have had a toothache. He or she will also ask if you have any other symptoms or medical conditions. Your healthcare provider will examine your tooth and mouth. Your provider may also examine your face and neck. You may need x-rays to check for an infection or cracked tooth. How is a toothache treated? Treatment depends on the cause of your toothache. You may need any of the following:  NSAIDs , such as ibuprofen, help decrease swelling, pain, and fever. This medicine is available with or without a doctor's order. NSAIDs can cause stomach bleeding or kidney problems in certain people. If you take blood thinner medicine, always ask if NSAIDs are safe for you. Always read the medicine label and follow directions. Do not give these medicines to children younger than 6 months without direction from a healthcare provider. Acetaminophen  decreases pain and fever. It is available without a doctor's order. Ask how much to take and how often to take it. Follow directions. Acetaminophen can cause liver damage if not taken correctly. Prescription pain medicine  may be given. Ask your healthcare provider how to take this medicine safely. Some prescription pain medicines contain acetaminophen. Do not take other medicines that contain acetaminophen without talking to your healthcare provider. Too much acetaminophen may cause liver damage. Prescription pain medicine may cause constipation. Ask your healthcare provider how to prevent or treat constipation. Antibiotics  help treat or prevent a bacterial infection. How can I manage my toothache?    Rinse your mouth with warm salt water  4 times a day or as directed. Eat soft foods  to help relieve pain caused by chewing. Apply ice on your jaw or cheek  for 15 to 20 minutes every hour or as directed. Use an ice pack, or put crushed ice in a plastic bag. Cover it with a towel before you apply it. Ice helps prevent tissue damage and decreases swelling and pain. How can I help prevent a toothache? Brush your teeth at least 2 times a day. Use dental floss to clean between your teeth at least 1 time a day. See your dentist regularly every 6 months for dental cleanings and oral exams. When should I seek immediate care? You have trouble breathing or swallowing. You have swelling in your face or neck. When should I contact my dentist?   You have a fever and chills. You have trouble opening or closing your mouth. You have swelling around your tooth. You have questions or concerns about your condition or care. CARE AGREEMENT:   You have the right to help plan your care. Learn about your health condition and how it may be treated. Discuss treatment options with your healthcare providers to decide what care you want to receive. You always have the right to refuse treatment. The above information is an  only. It is not intended as medical advice for individual conditions or treatments. Talk to your doctor, nurse or pharmacist before following any medical regimen to see if it is safe and effective for you. © Copyright [employer] 2022 Information is for End User's use only and may not be sold, redistributed or otherwise used for commercial purposes. Dental Abscess   WHAT YOU NEED TO KNOW:   What is a dental abscess? A dental abscess is a collection of pus in or around a tooth. A dental abscess is caused by bacteria. The bacteria can enter the tooth when the enamel (outer part of the tooth) is damaged by tooth decay.  Bacteria can also enter the tooth through a chip in the tooth or a cut in the gum. Food particles that are stuck between the teeth for a long time may also lead to an abscess. What increases my risk for a dental abscess? Poor tooth care    Medical conditions, such as diabetes, gastric reflux, or diseases that weaken the immune system     Procedures on the tooth or the gums    Dry mouth or very little saliva     Smoking or drinking alcohol    Radiation therapy of the head and neck    Certain medicines, such as steroids, allergy, or blood pressure medicines    What are the signs and symptoms of a dental abscess? Toothache, a loose tooth, or a tooth that is very sensitive to pressure or temperature    Bad breath, unpleasant taste, and drooling    Fever    Pain, redness, and swelling of the gums, or swelling of your face and neck    Pain when you open or close your mouth    Trouble opening your mouth    How is a dental abscess diagnosed? Your healthcare provider will examine your teeth and gums. He or she will check for pus, redness, swelling, or a mass. You may need an x-ray to check for infection in deeper tissues or broken teeth. How is a dental abscess treated? Medicines  may be given to treat a bacterial infection and decrease pain. Incision and drainage  is a cut in the abscess to allow the pus to drain. A sample of fluid may be collected from your abscess. The fluid is sent to a lab and tested for bacteria. Ask your healthcare provider for more information. A root canal  is a procedure to remove the bacteria and prevent more infection. It is usually done after an incision and drainage. A filling or crown will be placed over the tooth after you have healed from your root canal.     Tooth removal  may be needed if the infection affects deeper tissues. This is usually done after an incision and drainage. How can I manage my symptoms? Rinse your mouth every 2 hours with salt water. This will help keep the area clean.      Gently brush your teeth twice a day with a soft tooth brush. This will help keep the area clean. Eat soft foods as directed. Soft foods may cause less pain. Examples include applesauce, yogurt, and cooked pasta. Ask your healthcare provider how long to follow this instruction. Apply a warm compress to your tooth or gum. Use a cotton ball or gauze soaked in warm water. Remove the compress in 10 minutes or when it becomes cool. Repeat 3 times a day. What can I do to prevent another dental abscess? Brush your teeth at least 2 times a day  with fluoride toothpaste. Use dental floss at least once a day  to clean between your teeth. Rinse your mouth with water or mouthwash  after meals and snacks. Chew sugarless gum. Avoid sugary and starchy food that can stick between your teeth. Limit drinks high in sugar, such as soda or fruit juice. See your dentist every 6 months  for dental cleanings and oral exams. When should I seek immediate care? You have severe pain in your tooth or jaw. You have trouble breathing because of pain or swelling. When should I call my doctor or dentist?   Your symptoms get worse, even after treatment. Your mouth is bleeding. You cannot eat or drink because of pain or swelling. Your abscess returns. You have an injury that causes a crack in your tooth. You have questions or concerns about your condition or care. CARE AGREEMENT:   You have the right to help plan your care. Learn about your health condition and how it may be treated. Discuss treatment options with your healthcare providers to decide what care you want to receive. You always have the right to refuse treatment. The above information is an  only. It is not intended as medical advice for individual conditions or treatments. Talk to your doctor, nurse or pharmacist before following any medical regimen to see if it is safe and effective for you.   © Copyright [employer] 2022 Information is for End User's use only and may not be sold, redistributed or otherwise used for commercial purposes.

## 2023-08-02 NOTE — ASSESSMENT & PLAN NOTE
Patient noted to have recurrent dental tooth infection. Unable to dental for evaluation and treatment. Instructed to follow-up as soon as possible. Currently started on antibiotics of amoxicillin 875 mg p.o. twice daily for 10 days.

## 2023-08-02 NOTE — ASSESSMENT & PLAN NOTE
Patient to follow-up with Dr. Mainor Guerrier from psychiatry routinely. Patient currently maintained on Zoloft 100 mg p.o. daily.

## 2023-08-02 NOTE — ASSESSMENT & PLAN NOTE
Patient to follow-up with Dr. Keyla Torres from psychiatry routinely. Patient currently maintained on Zoloft 100 mg p.o. daily.

## 2023-08-02 NOTE — ASSESSMENT & PLAN NOTE
Patient to follow-up with Dr. Carson Sellers from psychiatry routinely. Patient currently maintained on Zoloft 100 mg p.o. daily.

## 2023-08-03 DIAGNOSIS — R11.0 NAUSEA: ICD-10-CM

## 2023-08-03 RX ORDER — ONDANSETRON 4 MG/1
4 TABLET, FILM COATED ORAL EVERY 8 HOURS PRN
Qty: 10 TABLET | Refills: 0 | Status: SHIPPED | OUTPATIENT
Start: 2023-08-03

## 2023-08-28 DIAGNOSIS — R11.0 NAUSEA: ICD-10-CM

## 2023-08-29 RX ORDER — ONDANSETRON 4 MG/1
4 TABLET, FILM COATED ORAL EVERY 8 HOURS PRN
Qty: 10 TABLET | Refills: 0 | Status: SHIPPED | OUTPATIENT
Start: 2023-08-29

## 2023-09-22 ENCOUNTER — TELEPHONE (OUTPATIENT)
Dept: PSYCHIATRY | Facility: CLINIC | Age: 38
End: 2023-09-22

## 2023-09-22 DIAGNOSIS — F32.89 OTHER DEPRESSION: ICD-10-CM

## 2023-09-22 RX ORDER — SERTRALINE HYDROCHLORIDE 100 MG/1
100 TABLET, FILM COATED ORAL DAILY
Qty: 30 TABLET | Refills: 0 | Status: SHIPPED | OUTPATIENT
Start: 2023-09-22

## 2023-09-22 NOTE — TELEPHONE ENCOUNTER
Rui Donaldson called and ELVIS staitng the Zoloft that was sent is on hold. Pharmacy needs verification.        Juan Daniel Corbin- [phone]        Please review in Dr Shanna Gonzalez absence

## 2023-09-22 NOTE — TELEPHONE ENCOUNTER
Medication Refill Request     Name of Medication Zoloft  Dose/Frequency 100mg take 1 tablet by mouth daily  Quantity 30  Verified pharmacy   [x]  Verified ordering Provider   [x]  Does patient have enough for the next 3 days? Yes [] No [x]  Does patient have a follow-up appointment scheduled?  Yes [x] No []   If so when is appointment: 10/16/2023 9am

## 2023-09-22 NOTE — TELEPHONE ENCOUNTER
Patient contacted the office to schedule a follow up visit with provider. Patient is now scheduled for 10/16/2023  at [facility] in office.

## 2023-10-16 ENCOUNTER — OFFICE VISIT (OUTPATIENT)
Dept: PSYCHIATRY | Facility: CLINIC | Age: 38
End: 2023-10-16
Payer: COMMERCIAL

## 2023-10-16 DIAGNOSIS — F32.89 OTHER DEPRESSION: ICD-10-CM

## 2023-10-16 PROCEDURE — 99213 OFFICE O/P EST LOW 20 MIN: CPT | Performed by: PSYCHIATRY & NEUROLOGY

## 2023-10-16 RX ORDER — SERTRALINE HYDROCHLORIDE 100 MG/1
100 TABLET, FILM COATED ORAL DAILY
Qty: 30 TABLET | Refills: 0 | Status: SHIPPED | OUTPATIENT
Start: 2023-10-16

## 2023-10-16 NOTE — PSYCH
Jo Johnson 1985 [id number]     The patient was seen for continuing care and pharmacotherapy. Doing fairly well. 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 Good appetite. Stable mood. ROS:  Urinary symptoms-occasional insomnia  Current Mental Status Evaluation:  Appearance:  Adequate hygiene and grooming, younger than stated age, and Good eye contact   Behavior:  Calm, Cooperative, and Pleasant   Mood:  Euthymic   Affect: appropriate   Speech: Normal volume and Normal rate   Thought Process:  Goal directed and coherent   Thought Content:  Does not verbalize delusional material   Perceptual Disturbances: Denies hallucinations and does not appear to be responding to internal stimuli   Risk Potential: No suicidal or homicidal ideation   Orientation:              Current Outpatient Medications   Medication Instructions    cholecalciferol (VITAMIN D3) 400 Units, Oral, Daily    ibuprofen (MOTRIN) 800 mg, Oral, Every 6 hours PRN    methadone (DOLOPHINE) 90 mg, Oral, Daily    multivitamin (THERAGRAN) TABS 1 tablet, Oral, Daily    ondansetron (ZOFRAN) 4 mg, Oral, Every 8 hours PRN    polyethylene glycol (Golytely) 4000 mL solution 4,000 mL, Oral, Once, Take 4000 mL by mouth once for 1 dose. Use as directed    sertraline (ZOLOFT) 100 mg, Oral, Daily          Treatment Recommendations- Risks Benefits    Follow up in 3 months. Risks, Benefits And Possible Side Effects Of Medications:  Risks, benefits, and possible side effects of medications explained to patient and patient verbalizes understanding    VIRTUAL VISIT [address] [place] [address] verbally agrees to participate in [employer].  Pt is aware that [employer] could be limited without vital signs or the ability to perform a full hands-on physical exam. Jo Johnson understands he or the provider may request at any time to terminate the video visit and request the patient to seek care or treatment in person.      Edwardo Encarnacion MD 10/16/23

## 2023-10-17 ENCOUNTER — TELEPHONE (OUTPATIENT)
Dept: PSYCHIATRY | Facility: CLINIC | Age: 38
End: 2023-10-17

## 2023-10-17 NOTE — TELEPHONE ENCOUNTER
Called and left message for Patient to return call to [phone] and schedule 3 month follow up with provider (1/15/24). Please Schedule.

## 2023-10-25 DIAGNOSIS — F32.89 OTHER DEPRESSION: ICD-10-CM

## 2023-10-25 RX ORDER — SERTRALINE HYDROCHLORIDE 100 MG/1
100 TABLET, FILM COATED ORAL DAILY
Qty: 30 TABLET | Refills: 0 | Status: SHIPPED | OUTPATIENT
Start: 2023-10-25

## 2023-11-08 ENCOUNTER — TELEPHONE (OUTPATIENT)
Dept: FAMILY MEDICINE CLINIC | Facility: CLINIC | Age: 38
End: 2023-11-08

## 2023-12-16 DIAGNOSIS — F32.89 OTHER DEPRESSION: ICD-10-CM

## 2023-12-18 RX ORDER — SERTRALINE HYDROCHLORIDE 100 MG/1
100 TABLET, FILM COATED ORAL DAILY
Qty: 30 TABLET | Refills: 0 | Status: SHIPPED | OUTPATIENT
Start: 2023-12-18

## 2024-01-16 DIAGNOSIS — F32.89 OTHER DEPRESSION: ICD-10-CM

## 2024-01-16 RX ORDER — SERTRALINE HYDROCHLORIDE 100 MG/1
100 TABLET, FILM COATED ORAL DAILY
Qty: 15 TABLET | Refills: 1 | Status: SHIPPED | OUTPATIENT
Start: 2024-01-16

## 2024-02-08 ENCOUNTER — TELEPHONE (OUTPATIENT)
Dept: PSYCHIATRY | Facility: CLINIC | Age: 39
End: 2024-02-08

## 2024-02-08 DIAGNOSIS — F32.89 OTHER DEPRESSION: ICD-10-CM

## 2024-02-08 NOTE — TELEPHONE ENCOUNTER
Medical record request was received from [facility] for the time frame of 2023 to Present. Will be place in Dr. Etienne Matias's mailbox by the end of the day.

## 2024-02-12 RX ORDER — SERTRALINE HYDROCHLORIDE 100 MG/1
100 TABLET, FILM COATED ORAL DAILY
Qty: 15 TABLET | Refills: 1 | Status: SHIPPED | OUTPATIENT
Start: 2024-02-12

## 2024-02-22 ENCOUNTER — TELEPHONE (OUTPATIENT)
Dept: PSYCHIATRY | Facility: CLINIC | Age: 39
End: 2024-02-22

## 2024-02-22 DIAGNOSIS — F32.89 OTHER DEPRESSION: ICD-10-CM

## 2024-02-22 NOTE — TELEPHONE ENCOUNTER
Patient contacted the office to schedule a follow up visit with provider. Patient is now scheduled for 2/28/2024  at 8:30am in office.

## 2024-02-22 NOTE — TELEPHONE ENCOUNTER
Medication Refill Request     Name of Medication zoloft   Dose/Frequency 100mg take 1 tablet by mouth daily  Quantity   Verified pharmacy   [x]  Verified ordering Provider   [x]  Does patient have enough for the next 3 days? Yes [] No [x]  Does patient have a follow-up appointment scheduled? Yes [x] No []   If so when is appointment: 2/28/2024 8:30am

## 2024-02-26 RX ORDER — SERTRALINE HYDROCHLORIDE 100 MG/1
100 TABLET, FILM COATED ORAL DAILY
Qty: 15 TABLET | Refills: 1 | Status: SHIPPED | OUTPATIENT
Start: 2024-02-26

## 2024-02-28 NOTE — TELEPHONE ENCOUNTER
Patient called apologizing as pt missed appt today at 8:30 am, patient wrote down the wrong time.     Patient contacted the office to schedule a follow up visit with provider. Patient is now scheduled for 3/4/24  at 11 am in office.

## 2024-03-04 ENCOUNTER — OFFICE VISIT (OUTPATIENT)
Dept: PSYCHIATRY | Facility: CLINIC | Age: 39
End: 2024-03-04
Payer: COMMERCIAL

## 2024-03-04 DIAGNOSIS — F41.0 PANIC ATTACK DUE TO POST TRAUMATIC STRESS DISORDER (PTSD): ICD-10-CM

## 2024-03-04 DIAGNOSIS — F43.10 PTSD (POST-TRAUMATIC STRESS DISORDER): Primary | ICD-10-CM

## 2024-03-04 DIAGNOSIS — F43.10 PANIC ATTACK DUE TO POST TRAUMATIC STRESS DISORDER (PTSD): ICD-10-CM

## 2024-03-04 DIAGNOSIS — F32.89 OTHER DEPRESSION: ICD-10-CM

## 2024-03-04 PROCEDURE — 99213 OFFICE O/P EST LOW 20 MIN: CPT | Performed by: PSYCHIATRY & NEUROLOGY

## 2024-03-04 RX ORDER — SERTRALINE HYDROCHLORIDE 100 MG/1
150 TABLET, FILM COATED ORAL DAILY
Qty: 45 TABLET | Refills: 1 | Status: SHIPPED | OUTPATIENT
Start: 2024-03-04

## 2024-03-04 NOTE — PSYCH
Moreno Rivera 1985 [id number]     The patient was seen for continuing care and pharmacotherapy.Quit smoking and vaping x 2 weeks. 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  His social life is limited although he is on friendly terms with the ex girlfriend.  He continues to have significant anxiety and panic-like symptoms when he hears people talking loudly or seem to be angry.      ROS:  Some GI related symptoms such as bloating and weight gain.  Current Mental Status Evaluation:  Appearance:  Adequate hygiene and grooming and Good eye contact   Behavior:  Calm and Cooperative   Mood:  Euthymic   Affect: appropriate   Speech: Normal volume and Normal rate   Thought Process:  Goal directed and coherent   Thought Content:  Does not verbalize delusional material   Perceptual Disturbances: Denies hallucinations and does not appear to be responding to internal stimuli   Risk Potential: No suicidal or homicidal ideation   Orientation:         Diagnosis ICD-10-CM Associated Orders   1. PTSD (post-traumatic stress disorder)  F43.10       2. Other depression  F32.89 sertraline (ZOLOFT) 100 mg tablet      3. Panic attack due to post traumatic stress disorder (PTSD)  F41.0     F43.10              Current Outpatient Medications   Medication Instructions    methadone (DOLOPHINE) 90 mg, Oral, Daily    ondansetron (ZOFRAN) 4 mg, Oral, Every 8 hours PRN    sertraline (ZOLOFT) 150 mg, Oral, Daily          Treatment Recommendations- Risks Benefits    Increase Zoloft to 150 mg daily.  Follow-up in 2 months  Risks, Benefits And Possible Side Effects Of Medications:  Risks, benefits, and possible side effects of medications explained to patient and patient verbalizes understanding    VIRTUAL VISIT DISCLAIMER    Moreno Rivera verbally agrees to participate in Virtual Care Services. Pt is aware that Virtual Care Services could be limited without vital signs or the ability to  perform a full hands-on physical exam. Moreno Rivera understands he or the provider may request at any time to terminate the video visit and request the patient to seek care or treatment in person.     Etienne Matias MD 03/04/24

## 2024-03-04 NOTE — PSYCH
"TREATMENT PLAN (Medication Management Only)        [facility]    Name/Date of Birth/MRN:  Moreno Rivera 38 y.o. 1985 MRN: [id number]  Date of Treatment Plan: March 4, 2024  Diagnosis/Diagnoses:   1. PTSD (post-traumatic stress disorder)    2. Other depression    3. Panic attack due to post traumatic stress disorder (PTSD)      Strengths/Personal Resources for Self-Care: taking medications as prescribed, ability to adapt to life changes, ability to communicate needs, ability to communicate well.  Area/Areas of need (in own words): \"PTSD\", anxiety symptoms.  1. Long Term Goal:   control anxiety  Target Date: 8 weeks - 4/29/2024  Person/Persons responsible for completion of goal: Moreno  2.  Short Term Objective (s) - How will we reach this goal?:   A.  Provider new recommended medication/dosage changes and/or continue medication(s): continue current medications as prescribed.  B.  N/A.  C.  N/A.  Target Date: 8 weeks - 4/29/2024  Person/Persons Responsible for Completion of Goal: Moreno   Progress Towards Goals: continuing treatment  Treatment Modality: medication management every 8 weeks  Review due 180 days from date of this plan: 6 months - 9/4/2024  Expected length of service: maintenance unless revised  My Physician/PA/NP and I have developed this plan together and I agree to work on the goals and objectives. I understand the treatment goals that were developed for my treatment.  Electronic Signatures: on file (unless signed below)    Etienne Matias MD 03/04/24  "

## 2024-05-16 DIAGNOSIS — F32.89 OTHER DEPRESSION: ICD-10-CM

## 2024-05-28 RX ORDER — SERTRALINE HYDROCHLORIDE 100 MG/1
TABLET, FILM COATED ORAL
Qty: 45 TABLET | Refills: 1 | Status: SHIPPED | OUTPATIENT
Start: 2024-05-28 | End: 2024-05-30 | Stop reason: SDUPTHER

## 2024-05-29 ENCOUNTER — OFFICE VISIT (OUTPATIENT)
Dept: PSYCHIATRY | Facility: CLINIC | Age: 39
End: 2024-05-29

## 2024-05-29 DIAGNOSIS — F32.89 OTHER DEPRESSION: ICD-10-CM

## 2024-05-29 DIAGNOSIS — F39 MOOD DISORDER (HCC): Primary | ICD-10-CM

## 2024-05-29 DIAGNOSIS — F43.21 ADJUSTMENT DISORDER WITH DEPRESSED MOOD: ICD-10-CM

## 2024-05-29 RX ORDER — QUETIAPINE FUMARATE 50 MG/1
25 TABLET, FILM COATED ORAL DAILY
Qty: 30 TABLET | Refills: 1 | Status: SHIPPED | OUTPATIENT
Start: 2024-05-29

## 2024-05-29 RX ORDER — SERTRALINE HYDROCHLORIDE 100 MG/1
TABLET, FILM COATED ORAL
Qty: 45 TABLET | Refills: 1 | OUTPATIENT
Start: 2024-05-29

## 2024-05-29 NOTE — PSYCH
"  Moreno Rivera 1985 [id number]     The patient was seen for continuing care and pharmacotherapy.Doing better,\"coming out of my shell\". Does online self help seminars that helps .Occasionally gets \"overaggravated\" and gets very anxious. It could take a couple of hours to calm down.This could happen while driving and gets road rage but keeps his feelings inside.    ROS:  As HPI  Current Mental Status Evaluation:  Appearance:  Adequate hygiene and grooming and Good eye contact   Behavior:  Calm and Cooperative   Mood:  Dysphoric   Affect: appropriate and broad   Speech: Normal volume and Normal rate   Thought Process:  Goal directed and coherent   Thought Content:  Does not verbalize delusional material   Perceptual Disturbances: Denies hallucinations and does not appear to be responding to internal stimuli   Risk Potential: No suicidal or homicidal ideation   Orientation:        No diagnosis found.       Current Outpatient Medications   Medication Instructions    methadone (DOLOPHINE) 90 mg, Oral, Daily    ondansetron (ZOFRAN) 4 mg, Oral, Every 8 hours PRN    sertraline (ZOLOFT) 100 mg tablet TAKE 1 AND 1/2 TABLETS(150 MG) BY MOUTH DAILY          Treatment Recommendations- Risks Benefits    Add quetiapine 25 mg PRN for angry episodes. Continue with sertraline  Risks, Benefits And Possible Side Effects Of Medications:  Risks, benefits, and possible side effects of medications explained to patient and patient verbalizes understanding    VIRTUAL VISIT DISCLAIMER    Moreno Rivera verbally agrees to participate in Virtual Care Services. Pt is aware that Virtual Care Services could be limited without vital signs or the ability to perform a full hands-on physical exam. Moreno Rivera understands he or the provider may request at any time to terminate the video visit and request the patient to seek care or treatment in person.     Etienne Matias MD 05/29/24           "

## 2024-05-29 NOTE — TELEPHONE ENCOUNTER
Called [facility], was informed they did not receive prescription that was sent on 5/28.   Please resend.     Called Moreno to inform of the above info, unable to leave message, voice mail full.

## 2024-05-29 NOTE — TELEPHONE ENCOUNTER
Patient called and lvm stating he was returning call, writer called patient back and was unable to lvm due to full mailbox

## 2024-05-29 NOTE — PSYCH
TREATMENT PLAN (Medication Management Only)        [facility]    Name/Date of Birth/MRN:  Moreno Rivera 38 y.o. 1985 MRN: [id number]  Date of Treatment Plan: May 29, 2024  Diagnosis/Diagnoses:   1. Mood disorder (HCC)    2. Adjustment disorder with depressed mood      Strengths/Personal Resources for Self-Care: supportive friends, taking medications as prescribed, ability to communicate needs.  Area/Areas of need (in own words): anxiety symptoms, mood instability.  1. Long Term Goal:   improve anxiety, maintain mood stability  Target Date: 6 months - 11/29/2024  Person/Persons responsible for completion of goal: Moreno  2.  Short Term Objective (s) - How will we reach this goal?:   A.  Provider new recommended medication/dosage changes and/or continue medication(s): continue current medications as prescribed.  B.  N/A.  C.  N/A.  Target Date: 4 weeks - 6/26/2024  Person/Persons Responsible for Completion of Goal: Moreno   Progress Towards Goals: continuing treatment  Treatment Modality: medication management every 4 weeks  Review due 180 days from date of this plan: 6 months - 11/29/2024  Expected length of service: maintenance unless revised  My Physician/PA/NP and I have developed this plan together and I agree to work on the goals and objectives. I understand the treatment goals that were developed for my treatment.  Electronic Signatures: on file (unless signed below)    Etienne Matias MD 05/29/24

## 2024-05-30 DIAGNOSIS — F32.89 OTHER DEPRESSION: ICD-10-CM

## 2024-05-30 RX ORDER — SERTRALINE HYDROCHLORIDE 100 MG/1
150 TABLET, FILM COATED ORAL DAILY
Qty: 45 TABLET | Refills: 1 | Status: SHIPPED | OUTPATIENT
Start: 2024-05-30

## 2024-05-30 NOTE — TELEPHONE ENCOUNTER
Forwarding for covering provider review in Doctor / PA / CNRP absence.    [facility] did not receive prescription for Zoloft that was sent on 5/28.   Please resend.

## 2024-07-15 ENCOUNTER — OFFICE VISIT (OUTPATIENT)
Dept: PSYCHIATRY | Facility: CLINIC | Age: 39
End: 2024-07-15
Payer: COMMERCIAL

## 2024-07-15 DIAGNOSIS — F39 MOOD DISORDER (HCC): ICD-10-CM

## 2024-07-15 DIAGNOSIS — F32.89 OTHER DEPRESSION: ICD-10-CM

## 2024-07-15 PROCEDURE — 99213 OFFICE O/P EST LOW 20 MIN: CPT | Performed by: PSYCHIATRY & NEUROLOGY

## 2024-07-15 RX ORDER — QUETIAPINE FUMARATE 50 MG/1
25 TABLET, FILM COATED ORAL DAILY
Qty: 30 TABLET | Refills: 1 | Status: SHIPPED | OUTPATIENT
Start: 2024-07-15

## 2024-07-15 RX ORDER — SERTRALINE HYDROCHLORIDE 100 MG/1
150 TABLET, FILM COATED ORAL DAILY
Qty: 45 TABLET | Refills: 1 | Status: SHIPPED | OUTPATIENT
Start: 2024-07-15

## 2024-07-15 NOTE — PSYCH
Moreno Rivera 1985 [id number]     The patient was seen for continuing care and pharmacotherapy.Feels better. 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  No new health problems    ROS:  No specific complaints  Current Mental Status Evaluation:  Appearance:  Adequate hygiene and grooming and Good eye contact   Behavior:  Calm and Cooperative   Mood:  Euthymic   Affect: appropriate and broad   Speech: Normal volume and Normal rate   Thought Process:  Goal directed and coherent   Thought Content:  Does not verbalize delusional material   Perceptual Disturbances: Denies hallucinations and does not appear to be responding to internal stimuli   Risk Potential: No suicidal or homicidal ideation   Orientation:         Diagnosis ICD-10-CM Associated Orders   1. Other depression  F32.89 sertraline (ZOLOFT) 100 mg tablet      2. Mood disorder (HCC)  F39 QUEtiapine (SEROquel) 50 mg tablet             Current Outpatient Medications   Medication Instructions    methadone (DOLOPHINE) 90 mg, Oral, Daily    ondansetron (ZOFRAN) 4 mg, Oral, Every 8 hours PRN    QUEtiapine (SEROQUEL) 25 mg, Oral, Daily    sertraline (ZOLOFT) 150 mg, Oral, Daily          Treatment Recommendations- Risks Benefits      Risks, Benefits And Possible Side Effects Of Medications:  Risks, benefits, and possible side effects of medications explained to patient and patient verbalizes understanding    VIRTUAL VISIT DISCLAIMER    Moreno Rivera verbally agrees to participate in Virtual Care Services. Pt is aware that Virtual Care Services could be limited without vital signs or the ability to perform a full hands-on physical exam. Moreno Rivera understands he or the provider may request at any time to terminate the video visit and request the patient to seek care or treatment in person.      Etienne Matias MD 07/15/24

## 2024-07-17 ENCOUNTER — TELEPHONE (OUTPATIENT)
Dept: PSYCHIATRY | Facility: CLINIC | Age: 39
End: 2024-07-17

## 2024-07-17 NOTE — TELEPHONE ENCOUNTER
Called and spoke with patient to return a call to [phone] and schedule 2 month follow up with provider (Etienne Matias). Scheduled 9/16 @ 11am.

## 2024-09-17 ENCOUNTER — TELEPHONE (OUTPATIENT)
Dept: PSYCHIATRY | Facility: CLINIC | Age: 39
End: 2024-09-17

## 2024-09-17 NOTE — LETTER
24     Moreno Rivera   : 1985   [address]  [address]  [facility] [address]       It is the policy of [facility] to monitor and manage appointments that have been no-showed or cancelled with less than 48-hour notice. This is necessary to ensure that we are able to provide timely access for all patients to our providers. Undue numbers of unutilized appointments delays necessary medical care for all patients.      Dear Moreno Rivera:      We are sorry that you missed your appointment with Etienne Matias MD on 2024. It has been 2 months  since your last appointment. Your health and follow-up care are important to us. We want to make you aware that you do not have another appointment with Etienne Matias MD scheduled.    Please be aware that our office policy states that if you 'no show' two or more Medication Management  appointments without prior notice of cancellation within in a calendar year, you may be discharged from Medication Management  treatment.  We want to bring this to your attention now to prevent an interruption of your care.  If you have any questions about this policy, please call us at the number above.     If we do not hear from you within 10 business days to make a follow up appointment, we will assume you are no longer interested in care here.    Thank you in advance for your cooperation and assistance.       Sincerely,      [facility] Support Staff

## 2024-09-21 DIAGNOSIS — F32.89 OTHER DEPRESSION: ICD-10-CM

## 2024-09-23 RX ORDER — SERTRALINE HYDROCHLORIDE 100 MG/1
TABLET, FILM COATED ORAL
Qty: 45 TABLET | Refills: 1 | Status: SHIPPED | OUTPATIENT
Start: 2024-09-23

## 2024-10-17 ENCOUNTER — TELEPHONE (OUTPATIENT)
Dept: PSYCHIATRY | Facility: CLINIC | Age: 39
End: 2024-10-17

## 2024-10-26 ENCOUNTER — HOSPITAL ENCOUNTER (EMERGENCY)
Facility: HOSPITAL | Age: 39
Discharge: HOME | End: 2024-10-26
Attending: EMERGENCY MEDICINE
Payer: COMMERCIAL

## 2024-10-26 VITALS
TEMPERATURE: 99 F | SYSTOLIC BLOOD PRESSURE: 129 MMHG | OXYGEN SATURATION: 97 % | RESPIRATION RATE: 16 BRPM | HEART RATE: 65 BPM | DIASTOLIC BLOOD PRESSURE: 95 MMHG

## 2024-10-26 DIAGNOSIS — K04.7 DENTAL ABSCESS: ICD-10-CM

## 2024-10-26 DIAGNOSIS — K08.89 PAIN, DENTAL: Primary | ICD-10-CM

## 2024-10-26 PROCEDURE — 99284 EMERGENCY DEPT VISIT MOD MDM: CPT | Performed by: EMERGENCY MEDICINE

## 2024-10-26 PROCEDURE — 99282 EMERGENCY DEPT VISIT SF MDM: CPT

## 2024-10-26 RX ORDER — CHLORHEXIDINE GLUCONATE ORAL RINSE 1.2 MG/ML
15 SOLUTION DENTAL 2 TIMES DAILY
Qty: 1893 ML | Refills: 0 | Status: SHIPPED | OUTPATIENT
Start: 2024-10-26

## 2024-10-26 RX ADMIN — AMOXICILLIN AND CLAVULANATE POTASSIUM 1 TABLET: 875; 125 TABLET, FILM COATED ORAL at 19:47

## 2024-10-26 NOTE — ED PROVIDER NOTES
Time reflects when diagnosis was documented in both MDM as applicable and the Disposition within this note       Time User Action Codes Description Comment    10/26/2024  7:43 PM Joe Grewal [K08.89] Pain, dental     10/26/2024  7:43 PM Joe Grewal [K04.7] Dental abscess           ED Disposition       ED Disposition   Discharge    Condition   Stable    Date/Time   Sat Oct 26, 2024  7:43 PM    Comment   Moreno Rivera discharge to home/self care.                   Assessment & Plan       Medical Decision Making  Dental abscess: Early, appropriate for antibiotic treatment at this time.  Will refer to oral surgery.  No drainable collection pointing to be available for ED drainage at this time    Risk  Prescription drug management.  Diagnosis or treatment significantly limited by social determinants of health.        ED Course as of 10/26/24 1953   Sat Oct 26, 2024   1947 Patient declines analgesics saying he came in early and pain is not yet severe.  He reports prior success with Augmentin from prior similar episodes.  No evidence of deep space abscess.       Medications   amoxicillin-clavulanate (AUGMENTIN) 875-125 mg per tablet 1 tablet (1 tablet Oral Given 10/26/24 1947)       ED Risk Strat Scores                                               History of Present Illness       Chief Complaint   Patient presents with    Dental Pain     Pt presents to the ED c/o L sided upper jaw dental pain that started  a few days ago       Past Medical History:   Diagnosis Date    Anxiety     Depression     Drug abuse and dependence (HCC)     Nerve damage     bilateral arms     PTSD (post-traumatic stress disorder)       Past Surgical History:   Procedure Laterality Date    TONSILECTOMY AND ADNOIDECTOMY        Family History   Problem Relation Age of Onset    Cancer Mother     Alcohol abuse Father     Substance Abuse Father     No Known Problems Sister     No Known Problems Brother     No Known Problems Son     No  Known Problems Daughter     No Known Problems Maternal Grandmother     No Known Problems Maternal Grandfather     Cancer Paternal Grandmother     No Known Problems Paternal Grandfather     No Known Problems Maternal Aunt     No Known Problems Maternal Uncle     No Known Problems Paternal Aunt     Multiple sclerosis Paternal Uncle     No Known Problems Cousin       Social History     Tobacco Use    Smoking status: Every Day     Current packs/day: 0.25     Average packs/day: 0.3 packs/day for 10.0 years (2.5 ttl pk-yrs)     Types: Cigarettes    Smokeless tobacco: Former   Vaping Use    Vaping status: Every Day    Substances: Nicotine, CBD (helps with anxiety), Flavoring   Substance Use Topics    Alcohol use: Not Currently    Drug use: Yes     Types: Marijuana      E-Cigarette/Vaping    E-Cigarette Use Current Every Day User     Comments fills vial once daily       E-Cigarette/Vaping Substances    Nicotine Yes     THC No     CBD Yes helps with anxiety    Flavoring Yes     Other No     Unknown No       I have reviewed and agree with the history as documented.     Patient reports history of bad teeth and developing dental abscesses.  He is required dental extractions in the past.  He reports that he accidentally poked 2 of his bad teeth when cleaning them yesterday and today he feels swelling above the teeth that is typical of his developing abscesses.  He came in early because he is aware hide by the skin get, although he tells me the pain is not yet severe.  No fevers or chills.  No difficulty opening and closing mouth.  No drainage.  He reports he will need referral for an oral surgeon.      Dental Pain      Review of Systems   All other systems reviewed and are negative.          Objective       ED Triage Vitals [10/26/24 1936]   Temperature Pulse Blood Pressure Respirations SpO2 Patient Position - Orthostatic VS   99 °F (37.2 °C) 65 129/95 16 97 % Sitting      Temp Source Heart Rate Source BP Location FiO2 (%) Pain  Score    Oral Monitor Left arm -- --      Vitals      Date and Time Temp Pulse SpO2 Resp BP Pain Score FACES Pain Rating User   10/26/24 1936 99 °F (37.2 °C) 65 97 % 16 129/95 -- --             Physical Exam  Vitals and nursing note reviewed.   HENT:      Head: Normocephalic.      Mouth/Throat:      Mouth: Mucous membranes are moist.      Comments: Poor dentition with very mild left upper facial swelling without fluctuance or pointing abscess in mouth.  Gingival induration surrounding 2 teeth in the left upper jaw.  Generalized poor dentition and fractured teeth.  No trismus.  Eyes:      Conjunctiva/sclera: Conjunctivae normal.   Cardiovascular:      Pulses: Normal pulses.   Pulmonary:      Effort: Pulmonary effort is normal.   Musculoskeletal:      Cervical back: Normal range of motion.   Skin:     General: Skin is warm.   Neurological:      General: No focal deficit present.      Mental Status: He is alert.   Psychiatric:         Mood and Affect: Mood normal.         Results Reviewed       None            No orders to display       Procedures    ED Medication and Procedure Management   Prior to Admission Medications   Prescriptions Last Dose Informant Patient Reported? Taking?   QUEtiapine (SEROquel) 50 mg tablet   No No   Sig: Take 0.5 tablets (25 mg total) by mouth in the morning   methadone (DOLOPHINE) 10 mg/mL oral concentrated solution  Self Yes No   Sig: Take 90 mg by mouth daily   ondansetron (ZOFRAN) 4 mg tablet   No No   Sig: Take 1 tablet (4 mg total) by mouth every 8 (eight) hours as needed for nausea or vomiting   sertraline (ZOLOFT) 100 mg tablet   No No   Sig: TAKE 1 AND 1/2 TABLETS(150 MG) BY MOUTH DAILY      Facility-Administered Medications: None     Patient's Medications   Discharge Prescriptions    AMOXICILLIN-CLAVULANATE (AUGMENTIN) 875-125 MG PER TABLET    Take 1 tablet by mouth every 12 (twelve) hours for 7 days       Start Date: 10/26/2024End Date: 11/2/2024       Order Dose: 1 tablet        Quantity: 14 tablet    Refills: 0    CHLORHEXIDINE (PERIDEX) 0.12 % SOLUTION    Apply 15 mL to the mouth or throat 2 (two) times a day May change volume to quantity to match available bottle sizes for quantity sufficient for 2-3 month supply       Start Date: 10/26/2024End Date: --       Order Dose: 15 mL       Quantity: 1893 mL    Refills: 0       ED SEPSIS DOCUMENTATION   Time reflects when diagnosis was documented in both MDM as applicable and the Disposition within this note       Time User Action Codes Description Comment    10/26/2024  7:43 PM Joe Grewal [K08.89] Pain, dental     10/26/2024  7:43 PM Joe Grewal [K04.7] Dental abscess                  Joe Grewal MD  10/26/24 1953

## 2024-11-22 ENCOUNTER — TELEPHONE (OUTPATIENT)
Dept: PSYCHIATRY | Facility: CLINIC | Age: 39
End: 2024-11-22

## 2024-11-22 NOTE — LETTER
24     Moreno Rivera   : 1985   [address] [facility] [address]  [place] [address]       It is the policy of [facility] to monitor and manage appointments that have been no-showed or cancelled with less than 48-hour notice. This is necessary to ensure that we are able to provide timely access for all patients to our providers. Undue numbers of unutilized appointments delays necessary medical care for all patients.      Dear Moreno Rivera       We are sorry that you missed your appointment with Etienne Matias MD on 2024. It has been 4 months or more since your last appointment. Your health and follow-up care are important to us. We want to make you aware that you do not have another appointment with Etienne Matias MD scheduled. If you have already rescheduled this appointment, please disregard.    Please be aware that our office policy states that if you 'no show' two or more Medication Management  appointments without prior notice of cancellation within in a calendar year, you may be discharged from Medication Management  treatment.  We want to bring this to your attention now to prevent an interruption of your care.  If you have any questions about this policy, please call us at the number above.     If we do not hear from you within 10 business days to make a follow up appointment, we will assume you are no longer interested in care here.    Thank you in advance for your cooperation and assistance.       Sincerely,      [facility] Support Staff

## 2024-11-22 NOTE — TELEPHONE ENCOUNTER
NO-SHOW LETTER MAILED TO Moreno Rivera.  ADDRESS: [address] [address]  SENT VIA [employer]

## 2024-12-29 DIAGNOSIS — F32.89 OTHER DEPRESSION: ICD-10-CM

## 2024-12-31 RX ORDER — SERTRALINE HYDROCHLORIDE 100 MG/1
TABLET, FILM COATED ORAL
Qty: 45 TABLET | Refills: 1 | Status: SHIPPED | OUTPATIENT
Start: 2024-12-31

## 2025-01-15 DIAGNOSIS — F39 MOOD DISORDER (HCC): ICD-10-CM

## 2025-01-15 DIAGNOSIS — F32.89 OTHER DEPRESSION: ICD-10-CM

## 2025-01-15 NOTE — TELEPHONE ENCOUNTER
Medication Refill Request     Name of Medication QUEtiapine (SEROquel) 50 mg tablet   Dose/Frequency  Take 0.5 tablets (25 mg total)   Quantity 30  Verified pharmacy   [x]  Verified ordering Provider   [x]  Does patient have enough for the next 3 days? Yes [] No [x]  Does patient have a follow-up appointment scheduled? Yes [] No [x]   If so when is appointment: 4/8/2025     Medication Refill Request     Name of Medication sertraline (ZOLOFT) 100 mg tablet   Dose/Frequency TAKE 1 AND 1/2 TABLETS(150 MG) BY MOUTH DAILY   Quantity 45  Verified pharmacy   [x]  Verified ordering Provider   [x]  Does patient have enough for the next 3 days? Yes [] No [x]  Does patient have a follow-up appointment scheduled? Yes [] No [x]   If so when is appointment: 4/8/2025

## 2025-01-20 RX ORDER — SERTRALINE HYDROCHLORIDE 100 MG/1
150 TABLET, FILM COATED ORAL DAILY
Qty: 45 TABLET | Refills: 1 | Status: SHIPPED | OUTPATIENT
Start: 2025-01-20

## 2025-01-20 RX ORDER — QUETIAPINE FUMARATE 50 MG/1
25 TABLET, FILM COATED ORAL DAILY
Qty: 30 TABLET | Refills: 1 | Status: SHIPPED | OUTPATIENT
Start: 2025-01-20

## 2025-02-27 ENCOUNTER — TELEPHONE (OUTPATIENT)
Dept: FAMILY MEDICINE CLINIC | Facility: CLINIC | Age: 40
End: 2025-02-27

## 2025-04-28 ENCOUNTER — TELEPHONE (OUTPATIENT)
Age: 40
End: 2025-04-28

## 2025-04-28 DIAGNOSIS — F32.89 OTHER DEPRESSION: ICD-10-CM

## 2025-04-28 RX ORDER — SERTRALINE HYDROCHLORIDE 100 MG/1
150 TABLET, FILM COATED ORAL DAILY
Qty: 45 TABLET | Refills: 1 | Status: SHIPPED | OUTPATIENT
Start: 2025-04-28

## 2025-04-28 NOTE — TELEPHONE ENCOUNTER
Patient called in regard to Sertraline medication. Pharmacy stated they need a prior authorization. Writer will forward request for review.

## 2025-04-28 NOTE — TELEPHONE ENCOUNTER
Patient is calling regarding cancelling an appointment.    Date/Time: 4/28/25 at 10:30am    Reason: patient does not have gas money to get to appt and cannot do virtual     Patient was rescheduled: YES [x] NO []  If yes, when was Patient reschedule for: 7/14/25 at 10:30am    Patient requesting call back to reschedule: YES [] NO [x]  Provider notified

## 2025-04-28 NOTE — TELEPHONE ENCOUNTER
PA for Sertraline 100 mg SUBMITTED to [employer]     via    [x]CMM-KEY: [id number]  []Surescripts-Case ID #   []Availity-Auth ID # NDC #  []Faxed to plan   []Other website   []Phone call Case ID #     []PA sent as URGENT    All office notes, labs and other pertaining documents and studies sent. Clinical questions answered. Awaiting determination from insurance company.     Turnaround time for your insurance to make a decision on your Prior Authorization can take 7-21 business days.

## 2025-04-28 NOTE — TELEPHONE ENCOUNTER
Called and spoke to the pharmacisit who stated no PA was requested, they are requesting a REFILL of the Sertraline 100 mg. Please send if appropriate thank you

## 2025-04-28 NOTE — TELEPHONE ENCOUNTER
PA for Sertraline 100 mg  NOT REQUIRED     Reason (screenshot if applicable)          Patient advised by          [x] MyChart Message  [] Phone call   []LMOM  []L/M to call office as no active Communication consent on file  []Unable to leave detailed message as VM not approved on Communication consent       Pharmacy advised by    []Fax  [x]Phone call

## 2025-06-03 ENCOUNTER — TELEPHONE (OUTPATIENT)
Dept: PSYCHIATRY | Facility: CLINIC | Age: 40
End: 2025-06-03

## 2025-06-03 NOTE — TELEPHONE ENCOUNTER
Letter sent via [employer] and to address on file to inform patient that current medication management provider will be transitioning to an all virtual platform. Due to patient's insurance coverage, patient must be transferred to an in office provider. Office number provided to contact if still interested in services.

## 2025-06-03 NOTE — LETTER
06/03/25       Moreno Rivera   [facility] [address] [facility] [address]  [facility] [address]       Dear Moreno Rivera:    This letter is to inform you that Etienne Matias MD is moving to an all virtual platform. Due to your insurance coverage, your care with Etienne Matias MD at [facility] must be terminated.    If you wish to return to [facility], you will need to have a transfer of care appointment with a new provider. Please call [phone] to schedule a new psychiatric intake if you are interested in doing so.      Please follow-up with your primary care provider for continual care.  When you have scheduled an appointment with another agency, please feel free to complete a release of information so that your records can be transferred to your new provider prior to your first appointment.  This will aid with the continuity of your care.      Sincerely,           [facility]        We will continue to provide psychotropic medications and/or emergency counseling as deemed appropriate by clinical staff for 45 days from receipt of this letter.                For a referral to another agency, we would suggest that you contact your primary health care provider or insurance company.   Please see Provider's List of agencies below:    Outpatient Mental Health Adult  [facility].  [address].  [facility].  [phone]   Joel Macdonald MD to [address] [place] [address].  Antonio CENTENO. [phone]   [facility] [address] [facility]. Eder Alvarez. [phone]   John Welch MD [address] [place] [address] Eder Porter. [phone]   Vj Grossman MD, [address] , Eder Alvarez. [phone]   Outpatient Mental Health Children, Adolescents and Family  [facility] [address]: [address] [place] [address] [phone]  [facility] [address]  EDER Liao [address]  and EDER Alvarez [address], [address]    [phone]  [facility] (14 and over)  [address] [place] [address] TARYN Stiles  [phone]  Outpatient Mental Health [place]  [facility] [address] [place], [place] [id number] [phone]  [facility]:   [place] [address] Phone: [phone]  [facility] Outpatient:Montcalm Harriett, [address] [place] [address] New Admissions [phone] Local [phone]  [facility]:   [facility] :[address] [place], [place] [address] Phone: [phone]  [facility] Outpatient: [address] [place] [address] Phone: New Admissions [phone] / Local Office [phone]  Drug & Alcohol Services:  [facility]:   [phone] or [phone]  [facility]:  [phone] or [phone]  [facility]:  [phone]  [facility]:  [phone]  [facility]: [phone]    [facility]:   [facility] Alcohol [facility]:  [address] [place] [address]  Phone: [phone]  Community Health CRISIS NUMBERS:    [place]:  [phone]    [place]: [phone] or [phone]    [place] / [place]: [phone]    [place]: [phone]    [place]: [phone]    [place]: [phone] ([phone])    [place]: [phone]    National Suicide Prevention Hotline:  1-806.593.5292 (TALK)

## 2025-06-25 ENCOUNTER — TELEPHONE (OUTPATIENT)
Dept: PSYCHIATRY | Facility: CLINIC | Age: 40
End: 2025-06-25

## 2025-06-25 DIAGNOSIS — F39 MOOD DISORDER (HCC): ICD-10-CM

## 2025-06-25 DIAGNOSIS — F32.89 OTHER DEPRESSION: ICD-10-CM

## 2025-06-25 RX ORDER — SERTRALINE HYDROCHLORIDE 100 MG/1
150 TABLET, FILM COATED ORAL DAILY
Qty: 45 TABLET | Refills: 0 | Status: SHIPPED | OUTPATIENT
Start: 2025-06-25

## 2025-06-25 RX ORDER — QUETIAPINE FUMARATE 50 MG/1
25 TABLET, FILM COATED ORAL DAILY
Qty: 30 TABLET | Refills: 0 | Status: SHIPPED | OUTPATIENT
Start: 2025-06-25

## 2025-06-25 NOTE — TELEPHONE ENCOUNTER
Called and spoke with patient regarding appt scheduled with provider on 7/14. Patient confirmed he received letter stating provider is going all virtual. Explained support services will follow up for a SANDRA once there is availability to schedule in [place].

## 2025-07-24 DIAGNOSIS — F32.89 OTHER DEPRESSION: ICD-10-CM

## 2025-07-25 RX ORDER — SERTRALINE HYDROCHLORIDE 100 MG/1
TABLET, FILM COATED ORAL
Qty: 45 TABLET | Refills: 0 | OUTPATIENT
Start: 2025-07-25